# Patient Record
Sex: FEMALE | Race: WHITE | NOT HISPANIC OR LATINO | Employment: FULL TIME | URBAN - METROPOLITAN AREA
[De-identification: names, ages, dates, MRNs, and addresses within clinical notes are randomized per-mention and may not be internally consistent; named-entity substitution may affect disease eponyms.]

---

## 2023-09-26 ENCOUNTER — HOSPITAL ENCOUNTER (EMERGENCY)
Facility: CLINIC | Age: 53
Discharge: HOME | End: 2023-09-26
Attending: EMERGENCY MEDICINE | Admitting: EMERGENCY MEDICINE
Payer: COMMERCIAL

## 2023-09-26 VITALS
HEART RATE: 84 BPM | SYSTOLIC BLOOD PRESSURE: 132 MMHG | DIASTOLIC BLOOD PRESSURE: 80 MMHG | TEMPERATURE: 97.3 F | RESPIRATION RATE: 18 BRPM | OXYGEN SATURATION: 100 %

## 2023-09-26 DIAGNOSIS — L03.012 CELLULITIS OF THUMB, LEFT: ICD-10-CM

## 2023-09-26 LAB
ANION GAP SERPL CALCULATED.3IONS-SCNC: 10 MMOL/L (ref 7–15)
BASOPHILS # BLD AUTO: 0.1 10E3/UL (ref 0–0.2)
BASOPHILS NFR BLD AUTO: 1 %
BUN SERPL-MCNC: 15.4 MG/DL (ref 6–20)
CALCIUM SERPL-MCNC: 9.3 MG/DL (ref 8.6–10)
CHLORIDE SERPL-SCNC: 101 MMOL/L (ref 98–107)
CREAT SERPL-MCNC: 0.81 MG/DL (ref 0.51–0.95)
DEPRECATED HCO3 PLAS-SCNC: 28 MMOL/L (ref 22–29)
EGFRCR SERPLBLD CKD-EPI 2021: 85 ML/MIN/1.73M2
EOSINOPHIL # BLD AUTO: 0.4 10E3/UL (ref 0–0.7)
EOSINOPHIL NFR BLD AUTO: 5 %
ERYTHROCYTE [DISTWIDTH] IN BLOOD BY AUTOMATED COUNT: 14 % (ref 10–15)
GLUCOSE SERPL-MCNC: 92 MG/DL (ref 70–99)
HCT VFR BLD AUTO: 36.5 % (ref 35–47)
HGB BLD-MCNC: 12.3 G/DL (ref 11.7–15.7)
HOLD SPECIMEN: NORMAL
IMM GRANULOCYTES # BLD: 0 10E3/UL
IMM GRANULOCYTES NFR BLD: 1 %
LYMPHOCYTES # BLD AUTO: 1.7 10E3/UL (ref 0.8–5.3)
LYMPHOCYTES NFR BLD AUTO: 23 %
MCH RBC QN AUTO: 30.8 PG (ref 26.5–33)
MCHC RBC AUTO-ENTMCNC: 33.7 G/DL (ref 31.5–36.5)
MCV RBC AUTO: 92 FL (ref 78–100)
MONOCYTES # BLD AUTO: 0.6 10E3/UL (ref 0–1.3)
MONOCYTES NFR BLD AUTO: 8 %
NEUTROPHILS # BLD AUTO: 4.6 10E3/UL (ref 1.6–8.3)
NEUTROPHILS NFR BLD AUTO: 62 %
NRBC # BLD AUTO: 0 10E3/UL
NRBC BLD AUTO-RTO: 0 /100
PLATELET # BLD AUTO: 272 10E3/UL (ref 150–450)
POTASSIUM SERPL-SCNC: 4 MMOL/L (ref 3.4–5.3)
RBC # BLD AUTO: 3.99 10E6/UL (ref 3.8–5.2)
SODIUM SERPL-SCNC: 139 MMOL/L (ref 135–145)
WBC # BLD AUTO: 7.4 10E3/UL (ref 4–11)

## 2023-09-26 PROCEDURE — 80048 BASIC METABOLIC PNL TOTAL CA: CPT | Performed by: EMERGENCY MEDICINE

## 2023-09-26 PROCEDURE — 99283 EMERGENCY DEPT VISIT LOW MDM: CPT

## 2023-09-26 PROCEDURE — 85025 COMPLETE CBC W/AUTO DIFF WBC: CPT | Performed by: EMERGENCY MEDICINE

## 2023-09-26 PROCEDURE — 36415 COLL VENOUS BLD VENIPUNCTURE: CPT | Performed by: EMERGENCY MEDICINE

## 2023-09-26 RX ORDER — CEPHALEXIN 500 MG/1
500 CAPSULE ORAL 4 TIMES DAILY
Qty: 28 CAPSULE | Refills: 0 | Status: ON HOLD | OUTPATIENT
Start: 2023-09-26 | End: 2023-09-29

## 2023-09-26 ASSESSMENT — ACTIVITIES OF DAILY LIVING (ADL)
ADLS_ACUITY_SCORE: 33
ADLS_ACUITY_SCORE: 33

## 2023-09-27 ENCOUNTER — ANESTHESIA EVENT (OUTPATIENT)
Dept: SURGERY | Facility: CLINIC | Age: 53
DRG: 603 | End: 2023-09-27
Payer: COMMERCIAL

## 2023-09-27 ENCOUNTER — ANESTHESIA (OUTPATIENT)
Dept: SURGERY | Facility: CLINIC | Age: 53
DRG: 603 | End: 2023-09-27
Payer: COMMERCIAL

## 2023-09-27 ENCOUNTER — HOSPITAL ENCOUNTER (INPATIENT)
Facility: CLINIC | Age: 53
LOS: 2 days | Discharge: HOME | DRG: 603 | End: 2023-09-29
Attending: EMERGENCY MEDICINE | Admitting: INTERNAL MEDICINE
Payer: COMMERCIAL

## 2023-09-27 DIAGNOSIS — M65.949 FLEXOR TENOSYNOVITIS OF FINGER: ICD-10-CM

## 2023-09-27 LAB
ANION GAP SERPL CALCULATED.3IONS-SCNC: 10 MMOL/L (ref 7–15)
BASOPHILS # BLD AUTO: 0.1 10E3/UL (ref 0–0.2)
BASOPHILS NFR BLD AUTO: 1 %
BUN SERPL-MCNC: 14.4 MG/DL (ref 6–20)
CALCIUM SERPL-MCNC: 9.3 MG/DL (ref 8.6–10)
CHLORIDE SERPL-SCNC: 100 MMOL/L (ref 98–107)
CREAT SERPL-MCNC: 1.01 MG/DL (ref 0.51–0.95)
DEPRECATED HCO3 PLAS-SCNC: 27 MMOL/L (ref 22–29)
EGFRCR SERPLBLD CKD-EPI 2021: 65 ML/MIN/1.73M2
EOSINOPHIL # BLD AUTO: 0.3 10E3/UL (ref 0–0.7)
EOSINOPHIL NFR BLD AUTO: 4 %
ERYTHROCYTE [DISTWIDTH] IN BLOOD BY AUTOMATED COUNT: 14.1 % (ref 10–15)
GLUCOSE SERPL-MCNC: 94 MG/DL (ref 70–99)
HCT VFR BLD AUTO: 38.1 % (ref 35–47)
HGB BLD-MCNC: 12.8 G/DL (ref 11.7–15.7)
HOLD SPECIMEN: NORMAL
IMM GRANULOCYTES # BLD: 0 10E3/UL
IMM GRANULOCYTES NFR BLD: 1 %
LYMPHOCYTES # BLD AUTO: 1.5 10E3/UL (ref 0.8–5.3)
LYMPHOCYTES NFR BLD AUTO: 21 %
MCH RBC QN AUTO: 30.8 PG (ref 26.5–33)
MCHC RBC AUTO-ENTMCNC: 33.6 G/DL (ref 31.5–36.5)
MCV RBC AUTO: 92 FL (ref 78–100)
MONOCYTES # BLD AUTO: 0.7 10E3/UL (ref 0–1.3)
MONOCYTES NFR BLD AUTO: 9 %
NEUTROPHILS # BLD AUTO: 4.9 10E3/UL (ref 1.6–8.3)
NEUTROPHILS NFR BLD AUTO: 64 %
NRBC # BLD AUTO: 0 10E3/UL
NRBC BLD AUTO-RTO: 0 /100
PLATELET # BLD AUTO: 282 10E3/UL (ref 150–450)
POTASSIUM SERPL-SCNC: 4.4 MMOL/L (ref 3.4–5.3)
RBC # BLD AUTO: 4.15 10E6/UL (ref 3.8–5.2)
SODIUM SERPL-SCNC: 137 MMOL/L (ref 135–145)
WBC # BLD AUTO: 7.5 10E3/UL (ref 4–11)

## 2023-09-27 PROCEDURE — 250N000013 HC RX MED GY IP 250 OP 250 PS 637: Performed by: STUDENT IN AN ORGANIZED HEALTH CARE EDUCATION/TRAINING PROGRAM

## 2023-09-27 PROCEDURE — 710N000009 HC RECOVERY PHASE 1, LEVEL 1, PER MIN: Performed by: ORTHOPAEDIC SURGERY

## 2023-09-27 PROCEDURE — 250N000011 HC RX IP 250 OP 636: Performed by: ORTHOPAEDIC SURGERY

## 2023-09-27 PROCEDURE — 87075 CULTR BACTERIA EXCEPT BLOOD: CPT | Performed by: ORTHOPAEDIC SURGERY

## 2023-09-27 PROCEDURE — 99222 1ST HOSP IP/OBS MODERATE 55: CPT | Performed by: INTERNAL MEDICINE

## 2023-09-27 PROCEDURE — 250N000009 HC RX 250: Performed by: ORTHOPAEDIC SURGERY

## 2023-09-27 PROCEDURE — 250N000025 HC SEVOFLURANE, PER MIN: Performed by: ORTHOPAEDIC SURGERY

## 2023-09-27 PROCEDURE — 250N000009 HC RX 250: Performed by: NURSE ANESTHETIST, CERTIFIED REGISTERED

## 2023-09-27 PROCEDURE — 120N000004 HC R&B MS OVERFLOW

## 2023-09-27 PROCEDURE — 250N000011 HC RX IP 250 OP 636: Performed by: NURSE ANESTHETIST, CERTIFIED REGISTERED

## 2023-09-27 PROCEDURE — 370N000017 HC ANESTHESIA TECHNICAL FEE, PER MIN: Performed by: ORTHOPAEDIC SURGERY

## 2023-09-27 PROCEDURE — 0HDGXZZ EXTRACTION OF LEFT HAND SKIN, EXTERNAL APPROACH: ICD-10-PCS | Performed by: ORTHOPAEDIC SURGERY

## 2023-09-27 PROCEDURE — 272N000001 HC OR GENERAL SUPPLY STERILE: Performed by: ORTHOPAEDIC SURGERY

## 2023-09-27 PROCEDURE — 87070 CULTURE OTHR SPECIMN AEROBIC: CPT | Performed by: ORTHOPAEDIC SURGERY

## 2023-09-27 PROCEDURE — 250N000011 HC RX IP 250 OP 636: Performed by: EMERGENCY MEDICINE

## 2023-09-27 PROCEDURE — 999N000141 HC STATISTIC PRE-PROCEDURE NURSING ASSESSMENT: Performed by: ORTHOPAEDIC SURGERY

## 2023-09-27 PROCEDURE — 36415 COLL VENOUS BLD VENIPUNCTURE: CPT | Performed by: EMERGENCY MEDICINE

## 2023-09-27 PROCEDURE — 99285 EMERGENCY DEPT VISIT HI MDM: CPT | Mod: 25

## 2023-09-27 PROCEDURE — 87040 BLOOD CULTURE FOR BACTERIA: CPT | Performed by: EMERGENCY MEDICINE

## 2023-09-27 PROCEDURE — 96365 THER/PROPH/DIAG IV INF INIT: CPT

## 2023-09-27 PROCEDURE — 87205 SMEAR GRAM STAIN: CPT | Performed by: ORTHOPAEDIC SURGERY

## 2023-09-27 PROCEDURE — 80048 BASIC METABOLIC PNL TOTAL CA: CPT | Performed by: STUDENT IN AN ORGANIZED HEALTH CARE EDUCATION/TRAINING PROGRAM

## 2023-09-27 PROCEDURE — 36415 COLL VENOUS BLD VENIPUNCTURE: CPT | Performed by: STUDENT IN AN ORGANIZED HEALTH CARE EDUCATION/TRAINING PROGRAM

## 2023-09-27 PROCEDURE — 360N000075 HC SURGERY LEVEL 2, PER MIN: Performed by: ORTHOPAEDIC SURGERY

## 2023-09-27 PROCEDURE — 258N000003 HC RX IP 258 OP 636: Performed by: NURSE ANESTHETIST, CERTIFIED REGISTERED

## 2023-09-27 PROCEDURE — 85025 COMPLETE CBC W/AUTO DIFF WBC: CPT | Performed by: STUDENT IN AN ORGANIZED HEALTH CARE EDUCATION/TRAINING PROGRAM

## 2023-09-27 RX ORDER — HYDROMORPHONE HCL IN WATER/PF 6 MG/30 ML
0.2 PATIENT CONTROLLED ANALGESIA SYRINGE INTRAVENOUS EVERY 5 MIN PRN
Status: DISCONTINUED | OUTPATIENT
Start: 2023-09-27 | End: 2023-09-27 | Stop reason: HOSPADM

## 2023-09-27 RX ORDER — CEFAZOLIN SODIUM/WATER 2 G/20 ML
2 SYRINGE (ML) INTRAVENOUS EVERY 8 HOURS
Status: DISCONTINUED | OUTPATIENT
Start: 2023-09-27 | End: 2023-09-27

## 2023-09-27 RX ORDER — DIPHENHYDRAMINE HYDROCHLORIDE 50 MG/ML
25 INJECTION INTRAMUSCULAR; INTRAVENOUS EVERY 6 HOURS PRN
Status: DISCONTINUED | OUTPATIENT
Start: 2023-09-27 | End: 2023-09-27 | Stop reason: HOSPADM

## 2023-09-27 RX ORDER — SODIUM CHLORIDE, SODIUM LACTATE, POTASSIUM CHLORIDE, CALCIUM CHLORIDE 600; 310; 30; 20 MG/100ML; MG/100ML; MG/100ML; MG/100ML
INJECTION, SOLUTION INTRAVENOUS CONTINUOUS
Status: DISCONTINUED | OUTPATIENT
Start: 2023-09-27 | End: 2023-09-27 | Stop reason: HOSPADM

## 2023-09-27 RX ORDER — ONDANSETRON 2 MG/ML
4 INJECTION INTRAMUSCULAR; INTRAVENOUS EVERY 30 MIN PRN
Status: DISCONTINUED | OUTPATIENT
Start: 2023-09-27 | End: 2023-09-27 | Stop reason: HOSPADM

## 2023-09-27 RX ORDER — HYDRALAZINE HYDROCHLORIDE 20 MG/ML
2.5-5 INJECTION INTRAMUSCULAR; INTRAVENOUS EVERY 10 MIN PRN
Status: DISCONTINUED | OUTPATIENT
Start: 2023-09-27 | End: 2023-09-27 | Stop reason: HOSPADM

## 2023-09-27 RX ORDER — ALBUTEROL SULFATE 0.83 MG/ML
2.5 SOLUTION RESPIRATORY (INHALATION) EVERY 4 HOURS PRN
Status: DISCONTINUED | OUTPATIENT
Start: 2023-09-27 | End: 2023-09-27 | Stop reason: HOSPADM

## 2023-09-27 RX ORDER — DEXAMETHASONE SODIUM PHOSPHATE 4 MG/ML
INJECTION, SOLUTION INTRA-ARTICULAR; INTRALESIONAL; INTRAMUSCULAR; INTRAVENOUS; SOFT TISSUE PRN
Status: DISCONTINUED | OUTPATIENT
Start: 2023-09-27 | End: 2023-09-27

## 2023-09-27 RX ORDER — ONDANSETRON 2 MG/ML
4 INJECTION INTRAMUSCULAR; INTRAVENOUS EVERY 6 HOURS PRN
Status: DISCONTINUED | OUTPATIENT
Start: 2023-09-27 | End: 2023-09-29 | Stop reason: HOSPADM

## 2023-09-27 RX ORDER — FENTANYL CITRATE 50 UG/ML
50 INJECTION, SOLUTION INTRAMUSCULAR; INTRAVENOUS EVERY 5 MIN PRN
Status: DISCONTINUED | OUTPATIENT
Start: 2023-09-27 | End: 2023-09-27 | Stop reason: HOSPADM

## 2023-09-27 RX ORDER — LIDOCAINE 40 MG/G
CREAM TOPICAL
Status: DISCONTINUED | OUTPATIENT
Start: 2023-09-27 | End: 2023-09-27 | Stop reason: HOSPADM

## 2023-09-27 RX ORDER — LABETALOL HYDROCHLORIDE 5 MG/ML
10 INJECTION, SOLUTION INTRAVENOUS
Status: DISCONTINUED | OUTPATIENT
Start: 2023-09-27 | End: 2023-09-27 | Stop reason: HOSPADM

## 2023-09-27 RX ORDER — BUPIVACAINE HYDROCHLORIDE 2.5 MG/ML
INJECTION, SOLUTION INFILTRATION; PERINEURAL PRN
Status: DISCONTINUED | OUTPATIENT
Start: 2023-09-27 | End: 2023-09-27 | Stop reason: HOSPADM

## 2023-09-27 RX ORDER — LIDOCAINE HYDROCHLORIDE 20 MG/ML
INJECTION, SOLUTION INFILTRATION; PERINEURAL PRN
Status: DISCONTINUED | OUTPATIENT
Start: 2023-09-27 | End: 2023-09-27

## 2023-09-27 RX ORDER — ONDANSETRON 4 MG/1
4 TABLET, ORALLY DISINTEGRATING ORAL EVERY 30 MIN PRN
Status: DISCONTINUED | OUTPATIENT
Start: 2023-09-27 | End: 2023-09-27 | Stop reason: HOSPADM

## 2023-09-27 RX ORDER — DIAZEPAM 10 MG/2ML
2.5 INJECTION, SOLUTION INTRAMUSCULAR; INTRAVENOUS
Status: DISCONTINUED | OUTPATIENT
Start: 2023-09-27 | End: 2023-09-27 | Stop reason: HOSPADM

## 2023-09-27 RX ORDER — DIMENHYDRINATE 50 MG/ML
25 INJECTION, SOLUTION INTRAMUSCULAR; INTRAVENOUS
Status: DISCONTINUED | OUTPATIENT
Start: 2023-09-27 | End: 2023-09-27 | Stop reason: HOSPADM

## 2023-09-27 RX ORDER — ACETAMINOPHEN 325 MG/1
975 TABLET ORAL ONCE
Status: COMPLETED | OUTPATIENT
Start: 2023-09-27 | End: 2023-09-27

## 2023-09-27 RX ORDER — DIPHENHYDRAMINE HCL 25 MG
25 CAPSULE ORAL EVERY 6 HOURS PRN
Status: DISCONTINUED | OUTPATIENT
Start: 2023-09-27 | End: 2023-09-27 | Stop reason: HOSPADM

## 2023-09-27 RX ORDER — PROPOFOL 10 MG/ML
INJECTION, EMULSION INTRAVENOUS PRN
Status: DISCONTINUED | OUTPATIENT
Start: 2023-09-27 | End: 2023-09-27

## 2023-09-27 RX ORDER — CEFAZOLIN SODIUM 1 G/3ML
1 INJECTION, POWDER, FOR SOLUTION INTRAMUSCULAR; INTRAVENOUS EVERY 8 HOURS
Status: DISCONTINUED | OUTPATIENT
Start: 2023-09-27 | End: 2023-09-29 | Stop reason: HOSPADM

## 2023-09-27 RX ORDER — MAGNESIUM HYDROXIDE 1200 MG/15ML
LIQUID ORAL PRN
Status: DISCONTINUED | OUTPATIENT
Start: 2023-09-27 | End: 2023-09-27 | Stop reason: HOSPADM

## 2023-09-27 RX ORDER — FENTANYL CITRATE 50 UG/ML
INJECTION, SOLUTION INTRAMUSCULAR; INTRAVENOUS PRN
Status: DISCONTINUED | OUTPATIENT
Start: 2023-09-27 | End: 2023-09-27

## 2023-09-27 RX ORDER — ONDANSETRON 4 MG/1
4 TABLET, ORALLY DISINTEGRATING ORAL EVERY 6 HOURS PRN
Status: DISCONTINUED | OUTPATIENT
Start: 2023-09-27 | End: 2023-09-29 | Stop reason: HOSPADM

## 2023-09-27 RX ORDER — HALOPERIDOL 5 MG/ML
1 INJECTION INTRAMUSCULAR
Status: DISCONTINUED | OUTPATIENT
Start: 2023-09-27 | End: 2023-09-27 | Stop reason: HOSPADM

## 2023-09-27 RX ORDER — ONDANSETRON 2 MG/ML
INJECTION INTRAMUSCULAR; INTRAVENOUS PRN
Status: DISCONTINUED | OUTPATIENT
Start: 2023-09-27 | End: 2023-09-27

## 2023-09-27 RX ORDER — SODIUM CHLORIDE, SODIUM LACTATE, POTASSIUM CHLORIDE, CALCIUM CHLORIDE 600; 310; 30; 20 MG/100ML; MG/100ML; MG/100ML; MG/100ML
INJECTION, SOLUTION INTRAVENOUS CONTINUOUS PRN
Status: DISCONTINUED | OUTPATIENT
Start: 2023-09-27 | End: 2023-09-27

## 2023-09-27 RX ORDER — GLYCOPYRROLATE 0.2 MG/ML
INJECTION, SOLUTION INTRAMUSCULAR; INTRAVENOUS PRN
Status: DISCONTINUED | OUTPATIENT
Start: 2023-09-27 | End: 2023-09-27

## 2023-09-27 RX ORDER — HYDROMORPHONE HCL IN WATER/PF 6 MG/30 ML
0.4 PATIENT CONTROLLED ANALGESIA SYRINGE INTRAVENOUS EVERY 5 MIN PRN
Status: DISCONTINUED | OUTPATIENT
Start: 2023-09-27 | End: 2023-09-27 | Stop reason: HOSPADM

## 2023-09-27 RX ORDER — CEFAZOLIN SODIUM 1 G/3ML
1 INJECTION, POWDER, FOR SOLUTION INTRAMUSCULAR; INTRAVENOUS ONCE
Status: COMPLETED | OUTPATIENT
Start: 2023-09-27 | End: 2023-09-27

## 2023-09-27 RX ORDER — KETOROLAC TROMETHAMINE 30 MG/ML
INJECTION, SOLUTION INTRAMUSCULAR; INTRAVENOUS PRN
Status: DISCONTINUED | OUTPATIENT
Start: 2023-09-27 | End: 2023-09-27

## 2023-09-27 RX ORDER — MAGNESIUM HYDROXIDE/ALUMINUM HYDROXICE/SIMETHICONE 120; 1200; 1200 MG/30ML; MG/30ML; MG/30ML
30 SUSPENSION ORAL EVERY 4 HOURS PRN
Status: DISCONTINUED | OUTPATIENT
Start: 2023-09-27 | End: 2023-09-29 | Stop reason: HOSPADM

## 2023-09-27 RX ORDER — FENTANYL CITRATE 50 UG/ML
25 INJECTION, SOLUTION INTRAMUSCULAR; INTRAVENOUS EVERY 5 MIN PRN
Status: DISCONTINUED | OUTPATIENT
Start: 2023-09-27 | End: 2023-09-27 | Stop reason: HOSPADM

## 2023-09-27 RX ORDER — ACETAMINOPHEN 325 MG/1
650 TABLET ORAL EVERY 6 HOURS PRN
Status: DISCONTINUED | OUTPATIENT
Start: 2023-09-27 | End: 2023-09-29 | Stop reason: HOSPADM

## 2023-09-27 RX ORDER — ACETAMINOPHEN 325 MG/1
975 TABLET ORAL ONCE
Status: DISCONTINUED | OUTPATIENT
Start: 2023-09-27 | End: 2023-09-27 | Stop reason: HOSPADM

## 2023-09-27 RX ADMIN — SODIUM CHLORIDE, POTASSIUM CHLORIDE, SODIUM LACTATE AND CALCIUM CHLORIDE: 600; 310; 30; 20 INJECTION, SOLUTION INTRAVENOUS at 20:52

## 2023-09-27 RX ADMIN — KETOROLAC TROMETHAMINE 30 MG: 30 INJECTION, SOLUTION INTRAMUSCULAR at 20:35

## 2023-09-27 RX ADMIN — PROPOFOL 200 MG: 10 INJECTION, EMULSION INTRAVENOUS at 20:35

## 2023-09-27 RX ADMIN — ONDANSETRON 4 MG: 2 INJECTION INTRAMUSCULAR; INTRAVENOUS at 20:35

## 2023-09-27 RX ADMIN — LIDOCAINE HYDROCHLORIDE 50 MG: 20 INJECTION, SOLUTION INFILTRATION; PERINEURAL at 20:35

## 2023-09-27 RX ADMIN — HYDROMORPHONE HYDROCHLORIDE 1 MG: 1 INJECTION, SOLUTION INTRAMUSCULAR; INTRAVENOUS; SUBCUTANEOUS at 20:35

## 2023-09-27 RX ADMIN — CEFAZOLIN 1 G: 1 INJECTION, POWDER, FOR SOLUTION INTRAMUSCULAR; INTRAVENOUS at 14:42

## 2023-09-27 RX ADMIN — ACETAMINOPHEN 975 MG: 325 TABLET, FILM COATED ORAL at 20:22

## 2023-09-27 RX ADMIN — SODIUM CHLORIDE, POTASSIUM CHLORIDE, SODIUM LACTATE AND CALCIUM CHLORIDE: 600; 310; 30; 20 INJECTION, SOLUTION INTRAVENOUS at 20:26

## 2023-09-27 RX ADMIN — FENTANYL CITRATE 100 MCG: 50 INJECTION, SOLUTION INTRAMUSCULAR; INTRAVENOUS at 20:35

## 2023-09-27 RX ADMIN — DEXAMETHASONE SODIUM PHOSPHATE 8 MG: 4 INJECTION, SOLUTION INTRA-ARTICULAR; INTRALESIONAL; INTRAMUSCULAR; INTRAVENOUS; SOFT TISSUE at 20:35

## 2023-09-27 RX ADMIN — CEFAZOLIN 1 G: 1 INJECTION, POWDER, FOR SOLUTION INTRAMUSCULAR; INTRAVENOUS at 23:42

## 2023-09-27 RX ADMIN — GLYCOPYRROLATE 0.2 MG: 0.2 INJECTION, SOLUTION INTRAMUSCULAR; INTRAVENOUS at 20:35

## 2023-09-27 ASSESSMENT — ACTIVITIES OF DAILY LIVING (ADL)
ADLS_ACUITY_SCORE: 20
ADLS_ACUITY_SCORE: 35

## 2023-09-27 ASSESSMENT — LIFESTYLE VARIABLES: TOBACCO_USE: 1

## 2023-09-27 NOTE — CONSULTS
Hand Surgery    Spoke with Dr. Patterson of ID regarding patient's case.  He recommended IV Ancef only for now pending improvement or intraoperative cultures.  OR pending.  Keep NPO until final plan determined.    Alyssia Lindsay MD

## 2023-09-27 NOTE — DISCHARGE INSTRUCTIONS
Discharge Instructions  Cellulitis    Cellulitis is an infection of the skin that occurs when bacteria enter the skin.   Symptoms are generally redness, swelling, warmth and pain.  Your infection appeared to be appropriate to treat at home with antibiotics.  However, sometimes your infection may be worse than it seemed at first, or may worsen with time. If you have new or worse symptoms, you may need to be seen again in the Emergency Department or by your primary provider.    Generally, every Emergency Department visit should have a follow-up clinic visit with either a primary or a specialty clinic/provider. Please follow-up as instructed by your emergency provider today.    Return to the Emergency Department if:  The redness, pain, or swelling gets a lot worse.  If the red area was marked, return if it is red significantly beyond the marked area.  You are unable to get your antibiotics, or are vomiting (throwing up) these pills, or you cannot take them.  You are feeling more ill, weak or lightheaded.  You start to run a new fever (temperature >101 F).  Anything else about the infection worries or concerns you.  Treatment:  Start your antibiotics right away, and take them as prescribed. Be sure to finish the whole prescription, even if you are better.  Apply a heating pad, warm packs, or warm water soaks to the infected area for 15 minutes at a time, at least 3 times a day. Do not use a heating pad on your feet or legs if you have diabetes. Do not sleep with a heating pad on, since this can cause burns or skin injury.  Rest your injured area for at least 1-2 days. After that you may start using your extremity again as long as there is not too much pain.   Raise the injured area above the level of your heart as much as possible in the first 1-2 days.  Tylenol  (acetaminophen), Motrin  (ibuprofen), or Advil  (ibuprofen) may help may help reduce pain and fever and may help you feel more comfortable. Be sure to read and  follow the package directions, and ask your provider if you have questions.    If you were given a prescription for medicine here today, be sure to read all of the information (including the package insert) that comes with your prescription.  This will include important information about the medicine, its side effects, and any warnings that you need to know about.  The pharmacist who fills the prescription can provide more information and answer questions you may have about the medicine.  If you have questions or concerns that the pharmacist cannot address, please call or return to the Emergency Department.     Remember that you can always come back to the Emergency Department if you are not able to see your regular provider in the amount of time listed above, if you get any new symptoms, or if there is anything that worries you.    ~~~~~~~~~~~~~~~~    As discussed, it is very important that you follow-up with orthopedic hand clinic as this type of infection can progress to a serious infection called flexor tenosynovitis.  No indication for emergent surgical consultation this evening, but if it is worsening in terms of pain or redness and swelling and you are unable to get into hand clinic we would like you to return to the emergency department

## 2023-09-27 NOTE — ED TRIAGE NOTES
Pt has swelling in right hand, cut her hand 2 weeks ago. Now c/o pain. Has been taking oral antibiotic x 3 doses without relief.      Triage Assessment       Row Name 09/27/23 1029       Triage Assessment (Adult)    Airway WDL WDL       Respiratory WDL    Respiratory WDL WDL       Skin Circulation/Temperature WDL    Skin Circulation/Temperature WDL --  swelling in right hand       Cardiac WDL    Cardiac WDL WDL       Peripheral/Neurovascular WDL    Peripheral Neurovascular WDL WDL       Cognitive/Neuro/Behavioral WDL    Cognitive/Neuro/Behavioral WDL WDL

## 2023-09-27 NOTE — ED NOTES
"PIT/Triage Evaluation    Patient is a 53 year old female presented with cellulitis. Alissa explains she cut her left hand 1.5 weeks ago and noticed swelling, pain, and redness in the area after healing. She states that she was prescribed Bactrim by Urgent Care yesterday and Keflex by the ED yesterday, which she has not started. She states that her symptoms have not improved. Alissa explains that she was recommended to the ED by orthopedics.    Exam is notable for:    Patient Vitals for the past 24 hrs:   BP Temp Temp src Pulse Resp SpO2 Height Weight   09/27/23 1026 (!) 136/106 98.8  F (37.1  C) Oral 77 20 97 % 1.651 m (5' 5\") 86.2 kg (190 lb)     General: No distress  Musculoskeletal: There is some superficial erythema of the left thenar eminence.  There is mild erythema of the tip of the thumb.  There is no tenderness along the flexor sheath.  She does have active and passive range of motion of the thumb although somewhat limited.  There is a small amount of lymphangitis on the flexor surface of the wrist.  No crepitance in this area.  New line neurologic: Sensation to the fingertips is intact.    Appropriate interventions for symptom management were initiated if applicable.  Appropriate diagnostic tests were initiated if indicated.    Important information for subsequent clinician:  I spoke with ADOLFO Gilliland assisting with the hand surgery service today and will evaluate the patient here in the ED.    I briefly evaluated the patient and developed an initial plan of care. I discussed this plan and explained that this brief interaction does not constitute a full evaluation. Patient/family understands that they should wait to be fully evaluated and discuss any test results with another clinician prior to leaving the hospital.    Scribe Disclosure:  I, Anil Mason, am serving as a scribe at 2:00 PM on 9/27/2023 to document services personally performed by Alonzo Ayala MD based on my observations and the " provider's statements to me.     Alonzo Ayala MD  09/27/23 9778

## 2023-09-27 NOTE — H&P
History and Physical     Kara E Behrendt MRN# [id number]   YOB: 1970 Age: 53 year old      Date of Admission:  9/27/2023    Primary care provider: [facility], [facility] [place]          Assessment and Plan:     Summary of Stay: Kara E Behrendt is a 53 year old female with a history of breast ca stage 2 in 2010 s/p bilateral mastectomy, BCC and melanoma in situ both excised  admitted on 9/27/2023 with tenosynovitis    Approximately 10 days ago she was cleaning the hide of a bull they just had butchered.  They were removing the skin/tendons/fat that were still attached to the hide.  Quite messy and slippery and while using a filet knife she knicked multiple sites on her fingers.  She then soaked her hand in peroxide for 3 days.  Things seemed to get better and were almost cleared but still with some residual swelling so for the past 3 days she soaked her hand in epsom salts.     Yesterday she woke up with worsening swelling and more discomfort.  She was seen in the ED and given a Rx for cephalexin and tmp-sulfa.  She took the tmp-sulfa but never had the cephalexin filled.  She returns to the ER at the behest of ortho due to concerns for tenosynovitis.     Dr Ayala did have the hand surgeon see her in the ER and they are recommending she go to the OR for I&D.  They will try to get her in tonight, and if unable will take in the am     ER:  VSS and she is afebrile   BMP and CBC wnl     Problem List:   Tenosynovitis  D/w Dr Patterson, cefazolin alone for now as don't want to suppress unusual pathogens given setting where initial injury occurred.  No e/o sepsis  Appreciate ortho and ID input    Remote hx of breast cancer  Would presume cured at this point     COVID 19   S/p 3 shot moderna series 1/2022    DVT Prophylaxis: Pneumatic Compression Devices  Code Status: Full Code  Functional Status: independent  Saba: not needed  Access:   PIV              Time spent 55 minutes reviewing epic including  notes/labs/prior hx, current medications.  In addition to interviewing and examining the patient, updated patient and family regarding plan of care          Chief Complaint:     Concern for tenosynovitis        History of Present Illness:    Kara E Behrendt is a 53 year old female with a history of breast ca stage 2 in  s/p bilateral mastectomy, BCC and melanoma in situ both excised  admitted on 2023 with tenosynovitis    Approximately 10 days ago she was cleaning the hide of a bull they just had butchered.  They were removing the skin/tendons/fat that were still attached to the hide.  Quite messy and slippery and while using a filet knife she knicked multiple sites on her fingers.  She then soaked her hand in peroxide for 3 days.  Things seemed to get better and were almost cleared but still with some residual swelling so for the past 3 days she soaked her hand in epsom salts.     Yesterday she woke up with worsening swelling and more discomfort.  She was seen in the ED and given a Rx for cephalexin and tmp-sulfa.  She took the tmp-sulfa but never had the cephalexin filled.  She returns to the ER at the behest of ortho due to concerns for tenosynovitis.     Dr Ayala did have the hand surgeon see her in the ER and they are recommending she go to the OR for I&D.  They will try to get her in tonight, and if unable will take in the am     ER:  VSS and she is afebrile   BMP and CBC wnl       The history is obtained in discussion with the ER provider  Dr Ayala and the patient with excellent reliability     Epic and Care everywhere were extensively reviewed        Past Medical History:     Past Medical History:   Diagnosis Date    Breast cancer     stage 2 s/p double mastectomy and right LN dissection    History of basal cell carcinoma     Melanoma of skin              Past Surgical History:     Past Surgical History:   Procedure Laterality Date     SECTION      ENDOSCOPIC RELEASE CARPAL  "TUNNEL Bilateral     MASTECTOMY Bilateral     [place] teeth extraction               Social History:     Social History     Tobacco Use    Smoking status: Former     Types: Cigarettes    Smokeless tobacco: Never   Substance Use Topics    Alcohol use: Not on file             Family History:     Family History   Problem Relation Age of Onset    Hypertension Mother     Prostate Cancer Father             Allergies:     Allergies   Allergen Reactions    Contrast Dye Anaphylaxis    Penicillins              Medications:     Awaiting med rec          Review of Systems:     A Comprehensive greater than 10 system review of systems was carried out.  Pertinent positives and negatives are noted above.  Otherwise negative for contributory information.           Physical Exam:   Blood pressure (!) 140/84, pulse 68, temperature 97.8  F (36.6  C), temperature source Temporal, resp. rate 16, height 1.651 m (5' 5\"), weight 85.7 kg (189 lb), SpO2 100 %.  Exam:    General:  Pleasant nad looks stated age  HEENT:  Head nc/at sclera clear PERRL O/P:  Moist mucus membranes no posterior pharyngeal erythema or exudate.  Neck is supple  Lungs: cta b nl effort   CV:  RRR no m/r/g no le edema  Abd:  S/nt/nd no r/g  Neuro:  Cn 2-12 grossly intact and vázquez  Alert and oriented affect appropriate   Skin:  W/d no c/c                 Data:     Results for orders placed or performed during the hospital encounter of 09/27/23   Basic metabolic panel (BMP)     Status: Abnormal   Result Value Ref Range    Sodium 137 135 - 145 mmol/L    Potassium 4.4 3.4 - 5.3 mmol/L    Chloride 100 98 - 107 mmol/L    Carbon Dioxide (CO2) 27 22 - 29 mmol/L    Anion Gap 10 7 - 15 mmol/L    Urea Nitrogen 14.4 6.0 - 20.0 mg/dL    Creatinine 1.01 (H) 0.51 - 0.95 mg/dL    GFR Estimate 65 >60 mL/min/1.73m2    Calcium 9.3 8.6 - 10.0 mg/dL    Glucose 94 70 - 99 mg/dL   Extra Tube ([facility] Draw)     Status: None    Narrative    The following orders were created for panel order Extra " Tube ([facility] Draw).  Procedure                               Abnormality         Status                     ---------                               -----------         ------                     Extra Blood Culture Bottle[[id number]]                       Final result               Extra Blue Top Tube[[id number]]                              Final result               Extra Red Top Tube[[id number]]                               Final result                 Please view results for these tests on the individual orders.   CBC with platelets and differential     Status: None   Result Value Ref Range    WBC Count 7.5 4.0 - 11.0 10e3/uL    RBC Count 4.15 3.80 - 5.20 10e6/uL    Hemoglobin 12.8 11.7 - 15.7 g/dL    Hematocrit 38.1 35.0 - 47.0 %    MCV 92 78 - 100 fL    MCH 30.8 26.5 - 33.0 pg    MCHC 33.6 31.5 - 36.5 g/dL    RDW 14.1 10.0 - 15.0 %    Platelet Count 282 150 - 450 10e3/uL    % Neutrophils 64 %    % Lymphocytes 21 %    % Monocytes 9 %    % Eosinophils 4 %    % Basophils 1 %    % Immature Granulocytes 1 %    NRBCs per 100 WBC 0 <1 /100    Absolute Neutrophils 4.9 1.6 - 8.3 10e3/uL    Absolute Lymphocytes 1.5 0.8 - 5.3 10e3/uL    Absolute Monocytes 0.7 0.0 - 1.3 10e3/uL    Absolute Eosinophils 0.3 0.0 - 0.7 10e3/uL    Absolute Basophils 0.1 0.0 - 0.2 10e3/uL    Absolute Immature Granulocytes 0.0 <=0.4 10e3/uL    Absolute NRBCs 0.0 10e3/uL   Extra Blood Culture Bottle     Status: None   Result Value Ref Range    Hold Specimen JIC    Extra Blue Top Tube     Status: None   Result Value Ref Range    Hold Specimen JIC    Extra Red Top Tube     Status: None   Result Value Ref Range    Hold Specimen JIC    CBC + differential     Status: None    Narrative    The following orders were created for panel order CBC + differential.  Procedure                               Abnormality         Status                     ---------                               -----------         ------                     CBC with platelets and  d...[[id number]]                      Final result                 Please view results for these tests on the individual orders.

## 2023-09-27 NOTE — ED PROVIDER NOTES
History     Chief Complaint:  Cellulitis    The history is provided by the patient.      Kara E Behrendt is a 53 year old female presenting with cellulitis. Alissa explains that she cut her left hand 1.5 weeks ago and noticed swelling, pain, and redness in the area after healing. She states that she was prescribed Bactrim by Urgent Care yesterday, of which she has taken three doses. She also notes being prescribed Keflex by the ED yesterday, which she has not started. She states that her symptoms have not improved. Alissa explains that she was recommended to the ED by orthopedics.    Review of External Notes:   2023 urgent care note reviewed.  The patient was given Bactrim and referred into the ED.    Medications:    Niacinamide  Bactrim    Past Medical History:    Breast cancer  Basal cell carcinoma  Melanoma of skin    Past Surgical History:     section  Endoscopic release carpal tunnel  Mastectomy  [place] teeth extraction    Physical Exam   Patient Vitals for the past 24 hrs:   BP Temp Temp src Pulse Resp SpO2   23 1615 130/63 97.7  F (36.5  C) Oral 74 18 98 %   23 1322 (!) 147/74 98.4  F (36.9  C) Oral 67 16 95 %   23 0831 (!) 141/80 98.5  F (36.9  C) Oral 69 18 95 %   23 0400 116/62 -- -- -- -- 95 %   23 0300 114/63 -- -- -- -- 93 %   23 0100 118/61 -- -- -- -- 92 %   23 0000 133/69 -- -- -- -- --   23 2330 138/73 -- -- -- 15 94 %   23 2300 (!) 142/76 -- -- -- -- --   23 2226 (!) 150/82 -- -- 72 14 96 %   23 (!) 143/74 -- -- 60 13 95 %   23 (!) 150/71 -- -- 60 13 --   23 (!) 14374 -- -- 69 17 --   23 136/74 -- -- 60 11 --   23 137/69 97.3  F (36.3  C) Temporal 77 13 96 %   23 (!) 145/62 -- -- 81 10 96 %   23 -- -- -- 74 19 95 %   23 (!) 142/67 -- -- 80 21 96 %   23 136/70 -- -- 87 (!) 32 97 %   23 136 (!) 96.6  F (35.9  C)  Temporal 83 16 96 %     Physical Exam  Constitutional:       General: She is not in acute distress.     Appearance: Normal appearance. She is not diaphoretic.   HENT:      Head: Atraumatic.      Right Ear: External ear normal.      Left Ear: External ear normal.      Mouth/Throat:      Mouth: Mucous membranes are moist.   Eyes:      General: No scleral icterus.     Conjunctiva/sclera: Conjunctivae normal.   Cardiovascular:      Rate and Rhythm: Normal rate and regular rhythm.      Heart sounds: Normal heart sounds.   Pulmonary:      Effort: No respiratory distress.      Breath sounds: Normal breath sounds.   Abdominal:      General: Abdomen is flat. There is no distension.      Tenderness: There is no abdominal tenderness.   Musculoskeletal:      Cervical back: Neck supple.      Comments: There is some superficial erythema of the left thenar eminence.  There is mild erythema of the tip of the thumb.  There is no tenderness along the flexor sheath.  She does have active and passive range of motion of the thumb although somewhat limited.  There is a small amount of lymphangitis on the flexor surface of the wrist.  No crepitance in this area.   Skin:     General: Skin is warm.      Findings: No rash.   Neurological:      General: No focal deficit present.      Mental Status: She is alert and oriented to person, place, and time.      Comments: Sensation to the fingertips intact.   Psychiatric:         Mood and Affect: Mood normal.         Behavior: Behavior normal.           Emergency Department Course   Laboratory:  Labs Ordered and Resulted from Time of ED Arrival to Time of ED Departure   BASIC METABOLIC PANEL - Abnormal       Result Value    Sodium 137      Potassium 4.4      Chloride 100      Carbon Dioxide (CO2) 27      Anion Gap 10      Urea Nitrogen 14.4      Creatinine 1.01 (*)     GFR Estimate 65      Calcium 9.3      Glucose 94     CBC WITH PLATELETS AND DIFFERENTIAL    WBC Count 7.5      RBC Count 4.15       Hemoglobin 12.8      Hematocrit 38.1      MCV 92      MCH 30.8      MCHC 33.6      RDW 14.1      Platelet Count 282      % Neutrophils 64      % Lymphocytes 21      % Monocytes 9      % Eosinophils 4      % Basophils 1      % Immature Granulocytes 1      NRBCs per 100 WBC 0      Absolute Neutrophils 4.9      Absolute Lymphocytes 1.5      Absolute Monocytes 0.7      Absolute Eosinophils 0.3      Absolute Basophils 0.1      Absolute Immature Granulocytes 0.0      Absolute NRBCs 0.0       Emergency Department Course & Assessments:       Interventions:  Medications   acetaminophen (TYLENOL) tablet 650 mg (650 mg Oral $Given 9/28/23 0843)     Or   acetaminophen (TYLENOL) Suppository 650 mg ( Rectal See Alternative 9/28/23 0843)   magnesium hydroxide (MILK OF MAGNESIA) suspension 30 mL (has no administration in time range)   ondansetron (ZOFRAN ODT) ODT tab 4 mg ( Oral See Alternative 9/28/23 1156)     Or   ondansetron (ZOFRAN) injection 4 mg (4 mg Intravenous $Given 9/28/23 1156)   alum & mag hydroxide-simethicone (MAALOX) suspension 30 mL (has no administration in time range)   ceFAZolin (ANCEF) 1 g vial to attach to  ml bag for ADULT or 50 ml bag for PEDS (1 g Intravenous $New Bag 9/28/23 1614)   lactated ringers infusion ( Intravenous $New Bag 9/28/23 1154)   ciprofloxacin (CIPRO) tablet 500 mg (500 mg Oral $Given 9/28/23 1102)   ceFAZolin (ANCEF) 1 g vial to attach to  ml bag for ADULT or 50 ml bag for PEDS (0 g Intravenous Stopped 9/27/23 1514)   acetaminophen (TYLENOL) tablet 975 mg (975 mg Oral $Given 9/27/23 2022)      Assessments:  1359 I obtained history and examined the patient as noted above.    Consultations/Discussion of Management or Tests:  ED Course as of 09/28/23 1937   Wed Sep 27, 2023   1531 I spoke with Dr. Mishra of the hospitalist team regarding the patient, who accepted the patient for admission.     Disposition:  The patient was admitted to the hospital under the care of Dr. Mishra.      Impression & Plan    Medical Decision Making:  This patient presents with an area of swelling of her thumb.  She was seen here by hand surgery.  It was thought that the patient may have a deep space infection or potentially flexor tenosynovitis.  She will be taken to the OR for debridement.    Diagnosis:    ICD-10-CM    1. Flexor tenosynovitis of finger  M65.9         Scribe Disclosure:  I, Anil Mason, am serving as a scribe at 2:53 PM on 9/27/2023 to document services personally performed by Alonzo Ayala MD based on my observations and the provider's statements to me.   9/27/2023   Alonzo Ayala MD McRoberts, Sean Edward, MD  09/28/23 1937

## 2023-09-27 NOTE — ANESTHESIA PREPROCEDURE EVALUATION
Anesthesia Pre-Procedure Evaluation    Patient: Kara E Behrendt   MRN: [id number] : 1970        Procedure : Procedure(s):  Irrigation and debridement left thumb and flexor tendon sheath          Past Medical History:   Diagnosis Date    Breast cancer 2010    stage 2 s/p double mastectomy and right LN dissection    History of basal cell carcinoma     Melanoma of skin       Past Surgical History:   Procedure Laterality Date     SECTION      ENDOSCOPIC RELEASE CARPAL TUNNEL Bilateral     MASTECTOMY Bilateral     [place] teeth extraction        Allergies   Allergen Reactions    Contrast Dye Anaphylaxis    Penicillins       Social History     Tobacco Use    Smoking status: Former     Types: Cigarettes    Smokeless tobacco: Never   Substance Use Topics    Alcohol use: Not on file      Wt Readings from Last 1 Encounters:   23 85.7 kg (189 lb)        Anesthesia Evaluation   Pt has had prior anesthetic. Type: General and MAC.    No history of anesthetic complications       ROS/MED HX  ENT/Pulmonary:     (+)                tobacco use, Past use,                      Neurologic:  - neg neurologic ROS     Cardiovascular:  - neg cardiovascular ROS     METS/Exercise Tolerance:     Hematologic:  - neg hematologic  ROS     Musculoskeletal: Comment: Infected thumb after contaminated injury      GI/Hepatic:  - neg GI/hepatic ROS     Renal/Genitourinary:  - neg Renal ROS     Endo: Comment: Class 1 obesity    (+)               Obesity,       Psychiatric/Substance Use:  - neg psychiatric ROS     Infectious Disease: Comment: As indicated in MS portion of note      Malignancy:   (+) Malignancy, History of Breast and Skin.Breast CA Remission status post Surgery and Chemo.  Skin CA Remission status post Surgery.      Other:  - neg other ROS          Physical Exam    Airway        Mallampati: II   TM distance: > 3 FB   Neck ROM: full   Mouth opening: > 3 cm    Respiratory Devices and Support         Dental       (+)  Minor Abnormalities - some fillings, tiny chips      Cardiovascular   cardiovascular exam normal       Rhythm and rate: regular and normal     Pulmonary           breath sounds clear to auscultation       Other findings: Lab Test        09/27/23 09/26/23 1031 2013          WBC          7.5          7.4           HGB          12.8         12.3          MCV          92           92            PLT          282          272            Lab Test        09/27/23 09/26/23 1031 2013          NA           137          139           POTASSIUM    4.4          4.0           CHLORIDE     100          101           CO2          27           28            BUN          14.4         15.4          CR           1.01*        0.81          ANIONGAP     10           10            AMAURY          9.3          9.3           GLC          94           92                      OUTSIDE LABS:  CBC:   Lab Results   Component Value Date    WBC 7.5 09/27/2023    WBC 7.4 09/26/2023    HGB 12.8 09/27/2023    HGB 12.3 09/26/2023    HCT 38.1 09/27/2023    HCT 36.5 09/26/2023     09/27/2023     09/26/2023     BMP:   Lab Results   Component Value Date     09/27/2023     09/26/2023    POTASSIUM 4.4 09/27/2023    POTASSIUM 4.0 09/26/2023    CHLORIDE 100 09/27/2023    CHLORIDE 101 09/26/2023    CO2 27 09/27/2023    CO2 28 09/26/2023    BUN 14.4 09/27/2023    BUN 15.4 09/26/2023    CR 1.01 (H) 09/27/2023    CR 0.81 09/26/2023    GLC 94 09/27/2023    GLC 92 09/26/2023     COAGS: No results found for: PTT, INR, FIBR  POC: No results found for: BGM, HCG, HCGS  HEPATIC: No results found for: ALBUMIN, PROTTOTAL, ALT, AST, GGT, ALKPHOS, BILITOTAL, BILIDIRECT, SHYANN  OTHER:   Lab Results   Component Value Date    AMAURY 9.3 09/27/2023       Anesthesia Plan    ASA Status:  2    NPO Status:  NPO Appropriate    Anesthesia Type: General.     - Airway: LMA   Induction: Intravenous.    Maintenance: Balanced.        Consents    Anesthesia Plan(s) and associated risks, benefits, and realistic alternatives discussed. Questions answered and patient/representative(s) expressed understanding.     - Discussed: Risks, Benefits and Alternatives for BOTH SEDATION and the PROCEDURE were discussed     - Discussed with:  Patient      - Extended Intubation/Ventilatory Support Discussed: No.      - Patient is DNR/DNI Status: No     Use of blood products discussed: No .     Postoperative Care    Pain management: IV analgesics, Oral pain medications, Multi-modal analgesia.   PONV prophylaxis: Ondansetron (or other 5HT-3), Dexamethasone or Solumedrol     Comments:                Efrem Nix MD

## 2023-09-27 NOTE — ED PROVIDER NOTES
History     Chief Complaint:  Rash     HPI   Kara E Behrendt is a 53 year old female who presents in the ED with worsening left thumb redness and swelling. She got several small cuts to her left middle finger and index finger with a filet knife several days ago while tanning and will hide. She had initial redness to her middle finger, but this resolved, but she has now developed swelling and redness to her thumb. She was updated on her tetanus and was given a dose of Bactrim at  today. The redness is started to streak up her forearm. She denies fever, nausea, or vomiting.    Independent Historian:    None - Patient Only.    Review of External Notes:  I reviewed CareEverywhere and updated Epic.    Medications:    Niacinamide     Past Medical History:    Nonmelanoma skin cancer   Melanoma in situ   Breast cancer    Past Surgical History:    Mastectomy    section   Carpal tunnel release   [place] teeth extraction     Physical Exam   Patient Vitals for the past 24 hrs:   BP Temp Pulse Resp SpO2   23 132/80 97.3  F (36.3  C) 84 18 100 %      Physical Exam  Nursing note and vitals reviewed.  Constitutional: Cooperative.   Cardiovascular: Normal perfusion in left thumb  Pulmonary/Chest: Effort normal.   Musculoskeletal: Limited flexion of left thumb due to swelling.  See below for further detail  Neurological: Alert.  Sensation in distal left thumb normal  Skin: Skin is warm and dry. Erythema and swelling to the left thumb pad without fluctuance. There is erythema and warmth over the thenar eminence with mild lymphangitis on the volar forearm. No tenderness to the flexor sheath. She is able to extend the thumb without difficulty.  Psychiatric: Normal mood and affect.     Emergency Department Course     Laboratory:  Labs Ordered and Resulted from Time of ED Arrival to Time of ED Departure   BASIC METABOLIC PANEL - Normal       Result Value    Sodium 139      Potassium 4.0      Chloride 101       Carbon Dioxide (CO2) 28      Anion Gap 10      Urea Nitrogen 15.4      Creatinine 0.81      GFR Estimate 85      Calcium 9.3      Glucose 92     CBC WITH PLATELETS AND DIFFERENTIAL    WBC Count 7.4      RBC Count 3.99      Hemoglobin 12.3      Hematocrit 36.5      MCV 92      MCH 30.8      MCHC 33.7      RDW 14.0      Platelet Count 272      % Neutrophils 62      % Lymphocytes 23      % Monocytes 8      % Eosinophils 5      % Basophils 1      % Immature Granulocytes 1      NRBCs per 100 WBC 0      Absolute Neutrophils 4.6      Absolute Lymphocytes 1.7      Absolute Monocytes 0.6      Absolute Eosinophils 0.4      Absolute Basophils 0.1      Absolute Immature Granulocytes 0.0      Absolute NRBCs 0.0        Interventions:  Medications - No data to display     Assessments:  2317 I obtained history and examined the patient as noted above.    Independent Interpretation (X-rays, CTs, rhythm strip):  None    Consultations/Discussion of Management or Tests:  None    Social Determinants of Health affecting care:  None     Disposition:  The patient was discharged to home.     Impression & Plan      Medical Decision Making:  Kara E Behrendt is a 53 year old female who presents for evaluation of skin redness. The history, physical exam and supporting data are consistent with cellulitis.  She does have mild ascending lymphangitis in the volar forearm but blood counts are reassuring and there is been no fevers.  She was started on Bactrim earlier today I will add dual coverage with Keflex.  Given the thumb pad swelling I am concerned for possible early felon versus early flexor tenosynovitis over though she does not meet criteria for these at this time.  No indication for emergent hand surgery consultation though I will advise her to follow-up in the next 1 to 2 days closely and if worsening advised to return to the ER.    Diagnosis:    ICD-10-CM    1. Cellulitis of thumb, left  L03.012            Discharge Medications:  New  Prescriptions    CEPHALEXIN (KEFLEX) 500 MG CAPSULE    Take 1 capsule (500 mg) by mouth 4 times daily for 7 days        Scribe Disclosure:  I, Jay Miguel, am serving as a scribe at 11:09 PM on 9/26/2023 to document services personally performed by Tae Dwyer MD based on my observations and the provider's statements to me.              Tae Dwyer MD  09/27/23 0010

## 2023-09-27 NOTE — ED NOTES
"[facility]  ED Nurse Handoff Report    ED Chief complaint: Cellulitis  . ED Diagnosis:   Final diagnoses:   None       Allergies:   Allergies   Allergen Reactions    Contrast Dye Anaphylaxis    Penicillins        Code Status: Full Code    Activity level - Baseline/Home:  independent.  Activity Level - Current:   independent.   Lift room needed: No.   Bariatric: No   Needed: No   Isolation: No.   Infection: Not Applicable.     Respiratory status: Room air    Vital Signs (within 30 minutes):   Vitals:    09/27/23 1026 09/27/23 1355 09/27/23 1416   BP: (!) 136/106 125/82    Pulse: 77 69    Resp: 20     Temp: 98.8  F (37.1  C)     TempSrc: Oral     SpO2: 97% 100% 98%   Weight: 86.2 kg (190 lb)     Height: 1.651 m (5' 5\")         Cardiac Rhythm:  ,      Pain level:    Patient confused: No.   Patient Falls Risk: nonskid shoes/slippers when out of bed and patient and family education.   Elimination Status:  Due to void      Patient Report - Initial Complaint: Cellulitis.   Focused Assessment: Skin- Healed cuts to left fingertips. Pt was cleaning a cow hide about 1.5 wks ago. Started to notice swelling to hand and fingers with erythema. Started on PO abx, but erythema has spread. Radial pulse present and palpable +2. Seen by ortho in ED, plan to go to OR.    Abnormal Results:   Labs Ordered and Resulted from Time of ED Arrival to Time of ED Departure   BASIC METABOLIC PANEL - Abnormal       Result Value    Sodium 137      Potassium 4.4      Chloride 100      Carbon Dioxide (CO2) 27      Anion Gap 10      Urea Nitrogen 14.4      Creatinine 1.01 (*)     GFR Estimate 65      Calcium 9.3      Glucose 94     CBC WITH PLATELETS AND DIFFERENTIAL    WBC Count 7.5      RBC Count 4.15      Hemoglobin 12.8      Hematocrit 38.1      MCV 92      MCH 30.8      MCHC 33.6      RDW 14.1      Platelet Count 282      % Neutrophils 64      % Lymphocytes 21      % Monocytes 9      % Eosinophils 4      % " Basophils 1      % Immature Granulocytes 1      NRBCs per 100 WBC 0      Absolute Neutrophils 4.9      Absolute Lymphocytes 1.5      Absolute Monocytes 0.7      Absolute Eosinophils 0.3      Absolute Basophils 0.1      Absolute Immature Granulocytes 0.0      Absolute NRBCs 0.0     BLOOD CULTURE   BLOOD CULTURE        No orders to display       Treatments provided: 1 gram ancef  Family Comments: N/A  OBS brochure/video discussed/provided to patient:  N/A  ED Medications:   Medications   ceFAZolin (ANCEF) 1 g vial to attach to  ml bag for ADULT or 50 ml bag for PEDS (1 g Intravenous $New Bag 9/27/23 9324)       Drips infusing:  No  For the majority of the shift this patient was Green.   Interventions performed were N/A.    Sepsis treatment initiated: No    Cares/treatment/interventions/medications to be completed following ED care: See orders      ED Nurse Name: Denisse Childress RN  3:11 PM

## 2023-09-27 NOTE — ED NOTES
PIT/Triage Evaluation    Patient presents emergency department with a complaint of left thumb redness and swelling with streaking up the left forearm.  Patient cut her left middle finger several days ago, and now has swelling and redness of her thumb.  Patient was given a tetanus shot and a dose of Bactrim by her primary care provider today.  The redness has now started to streak up her forearm.  No fevers, nausea, vomiting.      Exam is notable for:  General: No distress  Abdomen: Soft, non-tender  Respiratory: No tachypnea  Cardiovascular: Equal pulses, brisk cap refill  Extremities: Moving all extremities.    No Kanavel signs.  No pain with extension or flexion of the thumb, no tenderness along the tendon of the thumb, thumb is held in extension, no fusiform swelling.      Appropriate interventions for symptom management were initiated if applicable.  Appropriate diagnostic tests were initiated if indicated.    Important information for subsequent clinician:  Cellulitis of the left thumb, no kanavel signs.  Got tetanus and 1 dose of Bactrim from PCP today.  Redness is now streaking up the forearm. Labs ordered    I briefly evaluated the patient and developed an initial plan of care. I discussed this plan and explained that this brief interaction does not constitute a full evaluation. Patient/family understands that they should wait to be fully evaluated and discuss any test results with another clinician prior to leaving the hospital.       Shea Rausch MD  09/26/23 9491

## 2023-09-27 NOTE — ED TRIAGE NOTES
Pt presents to triage with c/o cellutitis in L hand. Was at [facility] [place] today, told her to come here. Pt was trimming bushes 1.5 weeks ago and cut self. Had tentanus shot today. 1 dose of antibiotics today.

## 2023-09-28 LAB
ANION GAP SERPL CALCULATED.3IONS-SCNC: 8 MMOL/L (ref 7–15)
BUN SERPL-MCNC: 13 MG/DL (ref 6–20)
CALCIUM SERPL-MCNC: 8.9 MG/DL (ref 8.6–10)
CHLORIDE SERPL-SCNC: 105 MMOL/L (ref 98–107)
CREAT SERPL-MCNC: 0.95 MG/DL (ref 0.51–0.95)
EGFRCR SERPLBLD CKD-EPI 2021: 70 ML/MIN/1.73M2
ERYTHROCYTE [DISTWIDTH] IN BLOOD BY AUTOMATED COUNT: 14 % (ref 10–15)
GLUCOSE SERPL-MCNC: 135 MG/DL (ref 70–99)
GRAM STAIN RESULT: NORMAL
HCO3 SERPL-SCNC: 25 MMOL/L (ref 22–29)
HCT VFR BLD AUTO: 37.6 % (ref 35–47)
HGB BLD-MCNC: 12.6 G/DL (ref 11.7–15.7)
MCH RBC QN AUTO: 30.8 PG (ref 26.5–33)
MCHC RBC AUTO-ENTMCNC: 33.5 G/DL (ref 31.5–36.5)
MCV RBC AUTO: 92 FL (ref 78–100)
PLATELET # BLD AUTO: 245 10E3/UL (ref 150–450)
POTASSIUM SERPL-SCNC: 4.7 MMOL/L (ref 3.4–5.3)
RBC # BLD AUTO: 4.09 10E6/UL (ref 3.8–5.2)
SODIUM SERPL-SCNC: 138 MMOL/L (ref 135–145)
WBC # BLD AUTO: 7.7 10E3/UL (ref 4–11)

## 2023-09-28 PROCEDURE — 250N000011 HC RX IP 250 OP 636: Performed by: ORTHOPAEDIC SURGERY

## 2023-09-28 PROCEDURE — 99222 1ST HOSP IP/OBS MODERATE 55: CPT | Performed by: INTERNAL MEDICINE

## 2023-09-28 PROCEDURE — 99232 SBSQ HOSP IP/OBS MODERATE 35: CPT | Performed by: INTERNAL MEDICINE

## 2023-09-28 PROCEDURE — 250N000013 HC RX MED GY IP 250 OP 250 PS 637: Performed by: INTERNAL MEDICINE

## 2023-09-28 PROCEDURE — 258N000003 HC RX IP 258 OP 636: Performed by: HOSPITALIST

## 2023-09-28 PROCEDURE — 250N000011 HC RX IP 250 OP 636: Mod: JZ | Performed by: INTERNAL MEDICINE

## 2023-09-28 PROCEDURE — 36415 COLL VENOUS BLD VENIPUNCTURE: CPT | Performed by: INTERNAL MEDICINE

## 2023-09-28 PROCEDURE — 85027 COMPLETE CBC AUTOMATED: CPT | Performed by: INTERNAL MEDICINE

## 2023-09-28 PROCEDURE — 80048 BASIC METABOLIC PNL TOTAL CA: CPT | Performed by: INTERNAL MEDICINE

## 2023-09-28 PROCEDURE — 120N000004 HC R&B MS OVERFLOW

## 2023-09-28 RX ORDER — SULFAMETHOXAZOLE/TRIMETHOPRIM 800-160 MG
1 TABLET ORAL 2 TIMES DAILY
Status: ON HOLD | COMMUNITY
Start: 2023-09-26 | End: 2023-09-29

## 2023-09-28 RX ORDER — NIACINAMIDE 500 MG
500 TABLET ORAL DAILY
COMMUNITY
Start: 2022-10-27

## 2023-09-28 RX ORDER — SODIUM CHLORIDE, SODIUM LACTATE, POTASSIUM CHLORIDE, CALCIUM CHLORIDE 600; 310; 30; 20 MG/100ML; MG/100ML; MG/100ML; MG/100ML
INJECTION, SOLUTION INTRAVENOUS CONTINUOUS
Status: DISCONTINUED | OUTPATIENT
Start: 2023-09-28 | End: 2023-09-29 | Stop reason: HOSPADM

## 2023-09-28 RX ORDER — CALCIUM CARBONATE/VITAMIN D3 600 MG-10
1 TABLET ORAL DAILY
COMMUNITY

## 2023-09-28 RX ORDER — CIPROFLOXACIN 500 MG/1
500 TABLET, FILM COATED ORAL EVERY 12 HOURS SCHEDULED
Status: DISCONTINUED | OUTPATIENT
Start: 2023-09-28 | End: 2023-09-29 | Stop reason: HOSPADM

## 2023-09-28 RX ORDER — LACTOBACILLUS RHAMNOSUS GG 10B CELL
1 CAPSULE ORAL AT BEDTIME
COMMUNITY

## 2023-09-28 RX ADMIN — SODIUM CHLORIDE, POTASSIUM CHLORIDE, SODIUM LACTATE AND CALCIUM CHLORIDE: 600; 310; 30; 20 INJECTION, SOLUTION INTRAVENOUS at 01:31

## 2023-09-28 RX ADMIN — SODIUM CHLORIDE, POTASSIUM CHLORIDE, SODIUM LACTATE AND CALCIUM CHLORIDE: 600; 310; 30; 20 INJECTION, SOLUTION INTRAVENOUS at 22:23

## 2023-09-28 RX ADMIN — CIPROFLOXACIN 500 MG: 500 TABLET, FILM COATED ORAL at 11:02

## 2023-09-28 RX ADMIN — CEFAZOLIN 1 G: 1 INJECTION, POWDER, FOR SOLUTION INTRAMUSCULAR; INTRAVENOUS at 08:35

## 2023-09-28 RX ADMIN — ONDANSETRON 4 MG: 2 INJECTION INTRAMUSCULAR; INTRAVENOUS at 11:56

## 2023-09-28 RX ADMIN — CIPROFLOXACIN 500 MG: 500 TABLET, FILM COATED ORAL at 20:22

## 2023-09-28 RX ADMIN — ACETAMINOPHEN 650 MG: 325 TABLET, FILM COATED ORAL at 08:43

## 2023-09-28 RX ADMIN — CEFAZOLIN 1 G: 1 INJECTION, POWDER, FOR SOLUTION INTRAMUSCULAR; INTRAVENOUS at 16:14

## 2023-09-28 RX ADMIN — SODIUM CHLORIDE, POTASSIUM CHLORIDE, SODIUM LACTATE AND CALCIUM CHLORIDE: 600; 310; 30; 20 INJECTION, SOLUTION INTRAVENOUS at 11:54

## 2023-09-28 ASSESSMENT — ACTIVITIES OF DAILY LIVING (ADL)
ADLS_ACUITY_SCORE: 20

## 2023-09-28 NOTE — PROGRESS NOTES
[facility]  Hospitalist Progress Note  Jhony Patel M.D., M.B.A.   09/28/2023    Reason for Stay/active problem list    Left thumb infection with lymphangitis       Assessment and Plan:        Summary of Stay:     Kara E Behrendt is a 53 year old female with a history of breast ca stage 2 in 2010 s/p bilateral mastectomy, BCC and melanoma in situ both excised  admitted on 9/27/2023 with concern for tenosynovitis     Dr Ayala did have the hand surgeon see her in the ER and patient had irrigation debridement of left thumb with IP joint irrigation on 9/27/2023    Problem List with Assessment and Plan:    Left thumb cellulitis and lymphangitis  --Presentation was initially concerning for tenosynovitis.  Patient does also treated with IV antibiotic and I am surgery was consulted.    --Patient underwent irrigation debridement of left thumb with IP joint irrigation on 9/27/2023 and postoperative wanted to change with left thumb infection and lymphangitis.  There was no evidence of tenosynovitis per orthopedic surgery  -- Patient was admitted to the hospital for IV antibiotic and close monitoring   -- Infectious disease consulted and patient was continued with ancef and oral ciprofloxacin for empiric coverage of antibiotics.  --Currently she is doing well with optimize pain control.  Cultures pending.  Plan to continue antibiotic and evaluate patient tomorrow for discharge.    Remote hx of breast cancer  Would presume cured at this point      COVID 19   S/p 3 shot moderna series 1/2022      VTE Prophylaxis: Ambulate every shift  Code Status: Full Code  Diet: Regular Diet Adult    Saba Catheter: Not present     Disposition: May discharge home in the next 1 to 2 days pending improvement and availability of culture reports.  Orthopedic surgery and infectious disease physician following patient.        Interval History (Subjective):        Patient is seen and examined by me today and medical record  "reviewed.Overnight events noted and care discussed with nursing staff.  Patient care assumed by me this morning.  She is feeling better today.  No significant pain.  She was seen by infectious disease service and orthopedic surgery team.  Care plan discussed with bedside nurse.  No fever or chills.                  Physical Exam:        Last Vital Signs:  BP (!) 147/74   Pulse 67   Temp 98.4  F (36.9  C) (Oral)   Resp 16   Ht 1.651 m (5' 5\")   Wt 85.7 kg (189 lb)   LMP  (LMP Unknown)   SpO2 95%   BMI 31.45 kg/m      Wt Readings from Last 5 Encounters:   09/27/23 85.7 kg (189 lb)        Constitutional: Awake, alert, cooperative, no apparent distress     Respiratory: Clear to auscultation bilaterally, no crackles or wheezing   Cardiovascular: Regular rate and rhythm, normal S1 and S2, and no murmur noted   Abdomen: Normal bowel sounds, soft, non-distended, non-tender   Skin: Left hand dressed    Neuro: Alert with  no new focal weakness   Extremities: No edema   Other(s):        All other systems: Negative          Medications:        All current medications were reviewed with changes reflected in problem list.         Data:      All new lab and imaging data was reviewed.      Data reviewed today: I reviewed all new labs and imaging results over the last 24 hours. I personally reviewed   Recent Labs   Lab 09/28/23 0708 09/27/23 1031 09/26/23 2013   WBC 7.7 7.5 7.4   HGB 12.6 12.8 12.3   HCT 37.6 38.1 36.5   MCV 92 92 92    282 272     No results for input(s): CULT in the last 168 hours.  Recent Labs   Lab 09/28/23 0708 09/27/23 1031 09/26/23 2013    137 139   POTASSIUM 4.7 4.4 4.0   CHLORIDE 105 100 101   CO2 25 27 28   ANIONGAP 8 10 10   * 94 92   BUN 13.0 14.4 15.4   CR 0.95 1.01* 0.81   GFRESTIMATED 70 65 85   AMAURY 8.9 9.3 9.3       Recent Labs   Lab 09/28/23 0708 09/27/23 1031 09/26/23 2013   * 94 92     COVID Status:  COVID-19 PCR Results           No data to display    "           COVID-19 Antibody Results, Testing for Immunity           No data to display                 Disclaimer: This note consists of symbols derived from keyboarding, dictation and/or voice recognition software. As a result, there may be errors in the script that have gone undetected. Please consider this when interpreting information found in this chart.

## 2023-09-28 NOTE — PHARMACY-ADMISSION MEDICATION HISTORY
Pharmacist Admission Medication History    Admission medication history is complete. The information provided in this note is only as accurate as the sources available at the time of the update.    Medication reconciliation/reorder completed by provider prior to medication history? No    Information Source(s): Patient via in-person and phone    Pertinent Information: -    Changes made to PTA medication list:  Added: all medications (except cephalexin)  Deleted: None  Changed: None    Medication Affordability:  Not including over the counter (OTC) medications, was there a time in the past 3 months when you did not take your medications as prescribed because of cost?: No    Allergies reviewed with patient and updates made in EHR: yes    Medication History Completed By: Miguelina Perez RPH 9/28/2023 3:46 PM    Prior to Admission medications    Medication Sig Last Dose Taking? Auth Provider Long Term End Date   calcium carbonate-vitamin D (CALTRATE) 600-10 MG-MCG per tablet Take 1 tablet by mouth daily 9/27/2023 Yes Unknown, Entered By History     lactobacillus rhamnosus, GG, (CULTURELL) capsule Take 1 capsule by mouth At Bedtime 9/26/2023 Yes Unknown, Entered By History     niacinamide 500 MG tablet Take 500 mg by mouth daily 9/27/2023 Yes Unknown, Entered By History     PREBIOTIC PRODUCT PO Take by mouth At Bedtime 9/26/2023 Yes Unknown, Entered By History     sulfamethoxazole-trimethoprim (BACTRIM DS) 800-160 MG tablet Take 1 tablet by mouth 2 times daily 9/27/2023 at am Yes Unknown, Entered By History  10/3/23   vitamin B-12 (CYANOCOBALAMIN) 500 MCG tablet Take 1 tablet by mouth daily 9/27/2023 Yes Unknown, Entered By History     cephALEXin (KEFLEX) 500 MG capsule Take 1 capsule (500 mg) by mouth 4 times daily for 7 days Not started  Tae Dwyer MD  10/3/23

## 2023-09-28 NOTE — PLAN OF CARE
Goal Outcome Evaluation:         Vital Signs: WNL.   Pain/Comfort: patient stating no pain at this time. Patient encouraged to call RN if patient starts having increased pain. Patient stated an understanding.   Assessment: PIV site c/d/I and flushed with + BR. IVF infusing per MAR. L hand with surgical dressing intact and ace wrapped. L thumb numb - CMS intact.   Diet: patient tolerating clears. Fluids encouraged.   Output: patient up with SBA to bathroom and voiding independently.   Activity/Ambulation: patient up to bathroom with SBA. Patient reminded to call for help when needing to get OOB. Patient stated an understanding.   Social: patient calm and cooperative.   Plan: Pain control. Monitor VS. Maintain PIV. IVF. IV ABX. Monitor I&O. Advance diet as tolerated. Monitor surgical incision site. Neurovascular checks to L hand. Will continue to monitor and will notify service with any questions or concerns.

## 2023-09-28 NOTE — BRIEF OP NOTE
"   [facility]    Brief Operative Note    Pre-operative diagnosis: Infection [B99.9] of left thumb with lymphangitis  Post-operative diagnosis Same as pre-operative diagnosis    Procedure: Irrigation and debridement left thumb with IP joint irrigation.  Thenar eminence incision and drainage.  Surgeon: Surgeon(s) and Role:     * Alyssia Lindsay MD - Primary     * Amina Gilliland PA-C - Assisting  Anesthesia: General plus local consisting of 10cc 0.25% Marcaine plain   Estimated Blood Loss: 2 cc    Drains: 3 pieces 1/4\" packing plain.  Specimens:   ID Type Source Tests Collected by Time Destination   A : Left thumb swab for culture Swab Thumb, Left ANAEROBIC BACTERIAL CULTURE ROUTINE, GRAM STAIN, AEROBIC BACTERIAL CULTURE ROUTINE Alyssia Lindsay MD 9/27/2023  8:52 PM    B : Left thumb IT joint for culture Swab Thumb, Left ANAEROBIC BACTERIAL CULTURE ROUTINE, GRAM STAIN, AEROBIC BACTERIAL CULTURE ROUTINE Alyssia Lindsay MD 9/27/2023  8:57 PM      Findings:   Evidence of infection: deep space infection.  Concern for left thumb IP joint infection.  No clear evidence for tenosynovitis.  Complications: None.  Implants: * No implants in log *    Ancef   ID consult  ADAT  Dressing clean, dry, and intact until 24 hours.  Ortho PA to do dressing change tomorrow.        "

## 2023-09-28 NOTE — PLAN OF CARE
Goal Outcome Evaluation:         Patient admitted to room 252 from PACU. Initial nursing assessment and admission questions completed. Patient oriented to room and unit procedures. VSS. Patient slightly drowsy but easily arousable and A&O x4. VSS. Patient with R limb alert and armband in place. PIV site c/d/I with IVF infusing. L hand wrapped with surgical dressing intact. Numbness to L thumb but CMS intact with all other fingers. Patient is resting comfortably in bed. Will continue to monitor and will notify service with any questions or concerns.

## 2023-09-28 NOTE — ANESTHESIA CARE TRANSFER NOTE
Patient: Kara E Behrendt    Procedure: Procedure(s):  Irrigation and debridement left thumb and flexor tendon sheath       Diagnosis: Infection [B99.9]  Diagnosis Additional Information: No value filed.    Anesthesia Type:   General     Note:    Oropharynx: oropharynx clear of all foreign objects  Level of Consciousness: awake  Oxygen Supplementation: room air    Independent Airway: airway patency satisfactory and stable  Dentition: dentition unchanged  Vital Signs Stable: post-procedure vital signs reviewed and stable  Report to RN Given: handoff report given  Patient transferred to: PACU    Handoff Report: Identifed the Patient, Identified the Reponsible Provider, Reviewed the pertinent medical history, Discussed the surgical course, Reviewed Intra-OP anesthesia mangement and issues during anesthesia, Set expectations for post-procedure period and Allowed opportunity for questions and acknowledgement of understanding      Vitals:  Vitals Value Taken Time   /70 09/27/23 2123   Temp 96.6  F (35.9  C) 09/27/23 2122   Pulse 90 09/27/23 2125   Resp 10 09/27/23 2125   SpO2 97 % 09/27/23 2125   Vitals shown include unvalidated device data.    Electronically Signed By: Efrem Nix MD  September 27, 2023  9:27 PM

## 2023-09-28 NOTE — PROGRESS NOTES
Orthopedic Surgery  Kara E Behrendt  09/28/2023     Admit Date:  9/27/2023    POD: 1 Day Post-Op   Procedure(s):  Irrigation and debridement left thumb and flexor tendon sheath     Patient resting comfortably sitting on the side of the bed.     Pain is controlled.   Tolerating oral intake.    Denies nausea or vomiting  Denies chest pain or shortness of breath    Temp:  [96.6  F (35.9  C)-98.5  F (36.9  C)] 98.5  F (36.9  C)  Pulse:  [60-87] 69  Resp:  [10-32] 18  BP: (114-150)/(61-84) 141/80  SpO2:  [92 %-100 %] 95 %    Alert and oriented  Dressing partially removed by ID this morning. Adaptic, gauze, and packing was left intact. Erythema of the surrounding skin has improved since the procedure. Moderate swelling of the thumb.    Limited ROM of thumb with mild pain. Able to passively range IP, MCP without pain.   +Radial pulse    Labs:  Recent Labs   Lab Test 09/28/23  0708 09/27/23  1031 09/26/23 2013   WBC 7.7 7.5 7.4   HGB 12.6 12.8 12.3    282 272     No lab results found.  No lab results found.      1. PLAN:   Antibiotics per ID recommendation.     Current dressing was removed, 3 packing strips pulled, and a new soft dressing was applied.    Continue current pain regiment.   Dressings: Keep intact.  Change if >60% saturated or peeling off.    Follow-up: 1-2 weeks post-op with Dr. Lindsay's team    2. Disposition   Anticipate d/c to home when medically cleared.    Ita Sotelo PA-C

## 2023-09-28 NOTE — CONSULTS
[facility]    Infectious Disease Consultation     Date of Admission:  9/27/2023  Date of Consult (When I saw the patient): 09/28/23    Assessment & Plan   Kara E Behrendt is a 53 year old who was admitted on 9/27/2023.     Impression: 1 53-year-old female, nearly 2 weeks ago cleaning a steer hide over several days, no gloves, multiple cuts in the left hand, initially no signs of infection.  Then evolution of swelling and erythema particularly notable in the thumb area where there were not many cuts, now status post I&D no clear tenosynovitis but signs of deep infection, some lymphangitis probably conventional staph or strep type infection but obviously this extensive exposure history with no gloves or protection raises slight concern about bacillus anthracis infection  2 prior melanoma and breast cancer    REC 1 await cultures from surgery, continue Ancef for conventional organisms, add oral Cipro for possible cutaneous anthrax, pictures not particularly suggestive of that diagnosis but exposure history quite extensive and hard to ignore possibility fortunately no tenosynovitis so likely oral therapy either way probably Augmentin and Cipro once improved await postop findings and cultures and adjust  2 even on the small chance of cutaneous anthrax likely not contagious but out of an abundance of caution we will do contact  as there is some drainage        Bhanu Patterson MD    Reason for Consult   Reason for consult: I was asked to evaluate this patient for left hand infection.    Primary Care Physician   [facility]    Chief Complaint   Left thumb pain    History is obtained from the patient and medical records    History of Present Illness   Kara E Behrendt is a 53 year old female who presents with left hand infection.  2 weeks ago the patient started doing self hide removal from a steer, did this with no gloves cut herself with a knife multiple times in the left hand over  the several days that they were doing this.  None of them were particular impressive looking did not look infected she cleaned the area up and watched.  Over the next several days slight erythema developed.  She sought medical attention recently put on antibiotics, with not improving and worsening pain in the left thumb and lymphangitic streak going up the arm sought medical attention.  She has had no systemic symptoms of any kind throughout and now status post operative intervention.    Past Medical History   I have reviewed this patient's medical history and updated it with pertinent information if needed.   Past Medical History:   Diagnosis Date    Breast cancer     stage 2 s/p double mastectomy and right LN dissection    History of basal cell carcinoma     Melanoma of skin        Past Surgical History   I have reviewed this patient's surgical history and updated it with pertinent information if needed.  Past Surgical History:   Procedure Laterality Date     SECTION      ENDOSCOPIC RELEASE CARPAL TUNNEL Bilateral     MASTECTOMY Bilateral     [place] teeth extraction         Prior to Admission Medications   Prior to Admission Medications   Prescriptions Last Dose Informant Patient Reported? Taking?   cephALEXin (KEFLEX) 500 MG capsule   No No   Sig: Take 1 capsule (500 mg) by mouth 4 times daily for 7 days      Facility-Administered Medications: None     Allergies   Allergies   Allergen Reactions    Contrast Dye Anaphylaxis    Penicillins        Immunization History     There is no immunization history on file for this patient.    Social History   I have reviewed this patient's social history and updated it with pertinent information if needed. Alissa E Behrendt  reports that she has quit smoking. Her smoking use included cigarettes. She has never used smokeless tobacco.    Family History   I have reviewed this patient's family history and updated it with pertinent information if needed.   Family History    Problem Relation Age of Onset    Hypertension Mother     Prostate Cancer Father        Review of Systems   The 10 point Review of Systems is negative other than the hand pain no systemic symptoms at all    Physical Exam   Temp: 98.5  F (36.9  C) Temp src: Oral BP: (!) 141/80 Pulse: 69   Resp: 18 SpO2: 95 % O2 Device: None (Room air)    Vital Signs with Ranges  Temp:  [96.6  F (35.9  C)-98.5  F (36.9  C)] 98.5  F (36.9  C)  Pulse:  [60-87] 69  Resp:  [10-32] 18  BP: (114-150)/(61-84) 141/80  SpO2:  [92 %-100 %] 95 %  189 lbs 0 oz  Body mass index is 31.45 kg/m .    GENERAL APPEARANCE:  awake  EYES: Eyes grossly normal to inspection  NECK: no adenopathy  RESP: lungs clear   CV: regular rates and rhythm  LYMPHATICS: normal ant/post cervical and supraclavicular nodes  ABDOMEN: soft, nontender  MS: extremities normal left hand wrapped, lymphangitis has resolved, pictures noted look like conventional infection rather than anthrax but of course not excluding diagnosis  SKIN: no suspicious lesions or rashes        Data   All laboratory and imaging data in the past 24 hours reviewed  No results for input(s): CULT in the last 168 hours.  No lab results found.    Invalid input(s):        All cultures:  Recent Labs   Lab 09/27/23  1435 09/27/23  1031   CULTURE No growth after 12 hours No growth after 12 hours      Blood culture:  Results for orders placed or performed during the hospital encounter of 09/27/23   Blood Culture Peripheral Blood    Specimen: Peripheral Blood   Result Value Ref Range    Culture No growth after 12 hours    Blood Culture Wrist, Left    Specimen: Wrist, Left; Blood   Result Value Ref Range    Culture No growth after 12 hours       Urine culture:  No results found for this or any previous visit.

## 2023-09-28 NOTE — ANESTHESIA POSTPROCEDURE EVALUATION
Patient: Kara E Behrendt    Procedure: Procedure(s):  Irrigation and debridement left thumb and flexor tendon sheath       Anesthesia Type:  General    Note:  Disposition: Inpatient   Postop Pain Control: Uneventful            Sign Out: Well controlled pain   PONV: No   Neuro/Psych: Uneventful            Sign Out: Acceptable/Baseline neuro status   Airway/Respiratory: Uneventful            Sign Out: Acceptable/Baseline resp. status   CV/Hemodynamics: Uneventful            Sign Out: Acceptable CV status; No obvious hypovolemia; No obvious fluid overload   Other NRE: NONE   DID A NON-ROUTINE EVENT OCCUR? No           Last vitals:  Vitals Value Taken Time   /70 09/27/23 2123   Temp     Pulse 87 09/27/23 2124   Resp 32 09/27/23 2124   SpO2 97 % 09/27/23 2124   Vitals shown include unvalidated device data.    Electronically Signed By: Efrem Nix MD  September 27, 2023  9:26 PM

## 2023-09-29 VITALS
DIASTOLIC BLOOD PRESSURE: 70 MMHG | OXYGEN SATURATION: 97 % | HEART RATE: 63 BPM | WEIGHT: 189 LBS | RESPIRATION RATE: 18 BRPM | BODY MASS INDEX: 31.49 KG/M2 | HEIGHT: 65 IN | TEMPERATURE: 98.3 F | SYSTOLIC BLOOD PRESSURE: 142 MMHG

## 2023-09-29 PROCEDURE — 258N000003 HC RX IP 258 OP 636: Performed by: HOSPITALIST

## 2023-09-29 PROCEDURE — 250N000011 HC RX IP 250 OP 636: Performed by: ORTHOPAEDIC SURGERY

## 2023-09-29 PROCEDURE — 99232 SBSQ HOSP IP/OBS MODERATE 35: CPT | Performed by: INTERNAL MEDICINE

## 2023-09-29 PROCEDURE — 250N000013 HC RX MED GY IP 250 OP 250 PS 637: Performed by: INTERNAL MEDICINE

## 2023-09-29 PROCEDURE — 99239 HOSP IP/OBS DSCHRG MGMT >30: CPT | Performed by: INTERNAL MEDICINE

## 2023-09-29 RX ORDER — CEPHALEXIN 500 MG/1
500 CAPSULE ORAL 4 TIMES DAILY
Qty: 40 CAPSULE | Refills: 0 | Status: SHIPPED | OUTPATIENT
Start: 2023-09-29 | End: 2023-10-09

## 2023-09-29 RX ORDER — CIPROFLOXACIN 500 MG/1
500 TABLET, FILM COATED ORAL EVERY 12 HOURS
Qty: 20 TABLET | Refills: 0 | Status: SHIPPED | OUTPATIENT
Start: 2023-09-29 | End: 2023-10-09

## 2023-09-29 RX ADMIN — CEFAZOLIN 1 G: 1 INJECTION, POWDER, FOR SOLUTION INTRAMUSCULAR; INTRAVENOUS at 08:55

## 2023-09-29 RX ADMIN — ACETAMINOPHEN 650 MG: 325 TABLET, FILM COATED ORAL at 14:10

## 2023-09-29 RX ADMIN — ACETAMINOPHEN 650 MG: 325 TABLET, FILM COATED ORAL at 00:27

## 2023-09-29 RX ADMIN — CEFAZOLIN 1 G: 1 INJECTION, POWDER, FOR SOLUTION INTRAMUSCULAR; INTRAVENOUS at 00:13

## 2023-09-29 RX ADMIN — SODIUM CHLORIDE, POTASSIUM CHLORIDE, SODIUM LACTATE AND CALCIUM CHLORIDE: 600; 310; 30; 20 INJECTION, SOLUTION INTRAVENOUS at 09:07

## 2023-09-29 RX ADMIN — CIPROFLOXACIN 500 MG: 500 TABLET, FILM COATED ORAL at 08:54

## 2023-09-29 ASSESSMENT — ACTIVITIES OF DAILY LIVING (ADL)
ADLS_ACUITY_SCORE: 20

## 2023-09-29 NOTE — PLAN OF CARE
Goal Outcome Evaluation:  1900-2330  Alissa is doing very well. Reports 0/10 pain in L thumb, CMS intact, no numbness/tingling, good cap refill. JOSH wound and L radial pulse d/t ace bandage dressing. Passing gas and had a BM today. Tolerating regular diet. Ambulating steady and independent, declining fall risk interventions. R foot PIV WDL infusing LR@100. R limb alert.  Plan: oral cipro, IV ancef q8h, joint and blood cultures still pending, and provide for comfort needs.

## 2023-09-29 NOTE — PLAN OF CARE
Temp: 98.3  F (36.8  C) Temp src: Oral BP: 130/54 Pulse: 61   Resp: 18 SpO2: 98 % O2 Device: None (Room air)       Assumed are 7570-7060. A&Ox4. Up ad jian when OOB. Patient refusing fall risk interventions. On RA. On a regular diet. JOSH surgical incision and L radial pulse due to ace bandage wrap in place. CMS intact. PIV in R foot is infusing LR 100mL/hr. Complaints of L thumb being sore 2/10, given PRN Tylenol with reports of relief. Plan of care ongoing.

## 2023-09-29 NOTE — PROGRESS NOTES
[facility]  Infectious Disease Progress Note          Assessment and Plan:   Date of Admission:  9/27/2023  Date of Consult (When I saw the patient): 09/28/23        Assessment & Plan  Kara E Behrendt is a 53 year old who was admitted on 9/27/2023.      Impression: 1 53-year-old female, nearly 2 weeks ago cleaning a steer hide over several days, no gloves, multiple cuts in the left hand, initially no signs of infection.  Then evolution of swelling and erythema particularly notable in the thumb area where there were not many cuts, now status post I&D no clear tenosynovitis but signs of deep infection, some lymphangitis probably conventional staph or strep type infection but obviously this extensive exposure history with no gloves or protection raises slight concern about bacillus anthracis infection  2 prior melanoma and breast cancer  3 PCN listed allergy but was just GI intolerance     REC 1 pending cultures from surgery, neg so far,while here Ancef for conventional organisms, add oral Cipro for possible cutaneous anthrax, pictures not particularly suggestive of that diagnosis but exposure history quite extensive and hard to ignore possibility fortunately no tenosynovitis so likely oral therapy either way probably Augmentin and Cipro once improved await postop findings and cultures and adjust  2 even on the small chance of cutaneous anthrax likely not contagious but out of an abundance of caution we will do contact  as there is some drainage  3 OK disposition on kwflex 500 qid 7 days and cipro 500 bid 10 days if worsens return if ABX issue will call                 Interval History:     no new complaints and doing well; no cp, sob, n/v/d, or abd pain. Feels well cxs neg              Medications:      ceFAZolin  1 g Intravenous Q8H    ciprofloxacin  500 mg Oral Q12H [facility] (08/20)                  Physical Exam:   Blood pressure (!) 142/70, pulse 63, temperature 98.3  F (36.8  C), temperature  "source Oral, resp. rate 18, height 1.651 m (5' 5\"), weight 85.7 kg (189 lb), SpO2 97 %.  Wt Readings from Last 2 Encounters:   09/27/23 85.7 kg (189 lb)     Vital Signs with Ranges  Temp:  [97.7  F (36.5  C)-98.4  F (36.9  C)] 98.3  F (36.8  C)  Pulse:  [61-74] 63  Resp:  [16-18] 18  BP: (130-158)/(54-74) 142/70  Cuff Mean (mmHg):  [94] 94  SpO2:  [95 %-100 %] 97 %    Constitutional: Awake, alert, cooperative, no apparent distress     Lungs: Clear to auscultation bilaterally, no crackles or wheezing   Cardiovascular: Regular rate and rhythm, normal S1 and S2, and no murmur noted   Abdomen: Normal bowel sounds, soft, non-distended, non-tender   Skin: No rashes, no cyanosis, no edema hand better   Other:           Data:   All microbiology laboratory data reviewed.  Recent Labs   Lab Test 09/28/23  0708 09/27/23  1031 09/26/23 2013   WBC 7.7 7.5 7.4   HGB 12.6 12.8 12.3   HCT 37.6 38.1 36.5   MCV 92 92 92    282 272     Recent Labs   Lab Test 09/28/23  0708 09/27/23  1031 09/26/23 2013   CR 0.95 1.01* 0.81     No lab results found.  No lab results found.    Invalid input(s):      "

## 2023-09-29 NOTE — DISCHARGE INSTRUCTIONS
Recommend Dressing change with daily Hibiclens soak.   Dressing change: Non adherent strip over sutures, cover with 4x4, wrap with gauze roll then ACE wrap.     Follow up in 7 days with Dr Lindsay for wound check

## 2023-09-29 NOTE — PROGRESS NOTES
"Orthopedic Surgery  9/29/2023  POD 2    S: Patient voices no unexpected ortho complaints today. Denies chest pain or shortness of breath. States had throbbing overnight but resolved with PO Tylenol.    O: Blood pressure 130/54, pulse 61, temperature 98.3  F (36.8  C), temperature source Oral, resp. rate 18, height 1.651 m (5' 5\"), weight 85.7 kg (189 lb), SpO2 98 %.  Lab Results   Component Value Date    HGB 12.6 09/28/2023     Left thumb incisions benign with scabs and no active draining. Moderate swelling to thumb, hand and other finger swelling minimal. Limited ROM of thumb. Has discomfort with PROM. Cap refill<2.    Noted cultures negative to date 9/29      A: Ms. Behrendt is doing well status post Procedure(s):  Irrigation and debridement left thumb and flexor tendon sheath.    P: Overall patient is improving since washout. Dressing removed and will be re-wrapped.     Recommend Dressing change with daily Hibiclens soak.   Defer to ID for discharge ABX  Doing well with Tylenol and Ibu for pain.  May discharge from Ortho perspective and follow up in 7 days with Dr Lindsay for wound check      Shea Ruiz PA-C  [phone] 2  "

## 2023-09-29 NOTE — PLAN OF CARE
Goal Outcome Evaluation:      Plan of Care Reviewed With: patient    Overall Patient Progress: improvingOverall Patient Progress: improving       VSS.  Pain controlled with po tylenol.  Dressing changed per provider; Sutures intact with mild swelling.  Adaptic and gauze placed to incision and wrapped with gauze roll and ACE per provider instruction.  Up independently in room.  Tolerating diet.  Received IV antibiotics.  Discharged home to self care.  Discharge instructions given; all questions answered.  Aware of follow up recommendations.

## 2023-09-29 NOTE — PLAN OF CARE
Goal Outcome Evaluation:      Plan of Care Reviewed With: patient     A&Ox4, VSS. Independent in room. Pt rested comfortably between cares.   Resp: Lung sounds clear bilaterally   GI/: Adequate intake & output. Pt passing flatus and has had no BM this shift. Denies N/V.   IV: Right foot influsing LR c/d/i  Pain/Comfort: 3-6/10. Tylenol X 1, Zofran X 1  Skin: no numbness or tingling on L thumb and good sensation. Left hand elevated w/pillow  Plan: continue IV abx, ID& ortho consult

## 2023-10-02 LAB
BACTERIA BLD CULT: NO GROWTH
BACTERIA BLD CULT: NO GROWTH

## 2023-10-02 NOTE — DISCHARGE SUMMARY
Physician Discharge Summary           [facility]  Hospitalist Discharge Summary-[facility]    Name: Kara E Behrendt    MRN: [id number]     YOB: 1970    Age: 53 year old                                                     Primary care provider: [facility]    Admit date:  2023    Discharge date and time: 2023  6:37 PM    Discharge Physician: Jhony Patel M.D., M.B.A.       Primary Discharge Diagnosis    Left thumb infection with cellulitis and  lymphangitis     Secondary Diagnosis /chronic medical conditions     Past Medical History:   Diagnosis Date    Breast cancer 2010    stage 2 s/p double mastectomy and right LN dissection    History of basal cell carcinoma     Melanoma of skin      Past Surgical History:  Past Surgical History:   Procedure Laterality Date     SECTION      ENDOSCOPIC RELEASE CARPAL TUNNEL Bilateral     IRRIGATION AND DEBRIDEMENT HAND, COMBINED Left 2023    Procedure: Irrigation and debridement left thumb and flexor tendon sheath;  Surgeon: Alyssia Lindsay MD;  Location: [facility] OR    MASTECTOMY Bilateral     [place] teeth extraction             Brief Summary of Hospital stay :       Please refer to  Admission H&P note  and subsequent progress notes in EMR for full details of patient care.    Reason for Hospitalization(C/C,HPI and brief patient summary)  - thumb infection   Significant findings(Primary diagnosis )Procedures and treatments provided(Hospital course ,consults, procedures):Please see below for details    Kara E Behrendt is a 53 year old female with a history of breast ca stage 2 in  s/p bilateral mastectomy, BCC and melanoma in situ both excised  admitted on 2023 with concern for tenosynovitis     Dr Ayala did have the hand surgeon see her in the ER and patient had irrigation debridement of left thumb with IP joint irrigation on 2023      Problem list (medical problems addressed during  "hospital stay):    Left thumb cellulitis and lymphangitis  --Presentation was initially concerning for tenosynovitis.  Patient does also treated with IV antibiotic and I am surgery was consulted.    --Patient underwent irrigation debridement of left thumb with IP joint irrigation on 9/27/2023 and postoperative wanted to change with left thumb infection and lymphangitis.  There was no evidence of tenosynovitis per orthopedic surgery  -- Patient was admitted to the hospital for IV antibiotic and close monitoring   -- Infectious disease consulted and patient was continued with ancef and oral ciprofloxacin for empiric coverage of antibiotics.  --Currently she is doing well with optimal  pain control.  Cultures pending.  Plan to continue  oral antibiotic per ID service  and follow up with PCP   Remote hx of breast cancer  Would presume cured at this point      COVID 19   S/p 3 shot moderna series 1/2022         Consultations during hospital stay:       ORTHOPEDIC SURGERY IP CONSULT  INFECTIOUS DISEASES IP CONSULT      Patient discharge Condition:     stable    BP (!) 142/70   Pulse 63   Temp 98.3  F (36.8  C) (Oral)   Resp 18   Ht 1.651 m (5' 5\")   Wt 85.7 kg (189 lb)   LMP  (LMP Unknown)   SpO2 97%   BMI 31.45 kg/m         Discharge Instructions:       Patient/family instructions: Written discharge instruction given to patient/family    Discharge Medications:       Review of your medicines        START taking        Dose / Directions   ciprofloxacin 500 MG tablet  Commonly known as: CIPRO  Indication: anthrax  Used for: Flexor tenosynovitis of finger      Dose: 500 mg  Take 1 tablet (500 mg) by mouth every 12 hours for 10 days  Quantity: 20 tablet  Refills: 0            CONTINUE these medicines which have NOT CHANGED        Dose / Directions   calcium carbonate-vitamin D 600-10 MG-MCG per tablet  Commonly known as: CALTRATE      Dose: 1 tablet  Take 1 tablet by mouth daily  Refills: 0     cephALEXin 500 MG " capsule  Commonly known as: KEFLEX  Used for: Flexor tenosynovitis of finger      Dose: 500 mg  Take 1 capsule (500 mg) by mouth 4 times daily for 10 days  Quantity: 40 capsule  Refills: 0     lactobacillus rhamnosus (GG) capsule      Dose: 1 capsule  Take 1 capsule by mouth At Bedtime  Refills: 0     niacinamide 500 MG tablet      Dose: 500 mg  Take 500 mg by mouth daily  Refills: 0     PREBIOTIC PRODUCT PO      Take by mouth At Bedtime  Refills: 0     vitamin B-12 500 MCG tablet  Commonly known as: CYANOCOBALAMIN      Dose: 1 tablet  Take 1 tablet by mouth daily  Refills: 0            STOP taking      sulfamethoxazole-trimethoprim 800-160 MG tablet  Commonly known as: BACTRIM DS                  Where to get your medicines        These medications were sent to [id number] - [place], [place] - [address]  [address], [facility] [address]      Phone: [phone]   cephALEXin 500 MG capsule  ciprofloxacin 500 MG tablet          Discharge diet:Orders Placed This Encounter      Diet    regular diet      Discharge activity:Activity as tolerated      Discharge follow-up:    Follow up with primary care provider in 7 days or earlier if symptoms return or gets worse.    Follow up with consultant as instructed  with  hand surgeon as instructed       Other instructions:    We discussed with patient/family about detail discharge instructions as well as discharge medications above including potential risks,side effects and benefits.Patient/family understood benefits and potential serious side effects of taking these medications and need to follow up with PCP if the patient develops complications.  Patient is also advised to see a doctor immediately for severe symptoms.        Major procedure performed/  Significant Diagnostic Studies:       Recent Labs   Lab 09/28/23  0708 09/27/23  1031 09/26/23 2013   WBC 7.7 7.5 7.4   HGB 12.6 12.8 12.3   HCT 37.6 38.1 36.5   MCV 92 92 92    282 272     No  results for input(s): CULT in the last 168 hours.  Recent Labs   Lab 09/28/23  0708 09/27/23  1031 09/26/23 2013    137 139   POTASSIUM 4.7 4.4 4.0   CHLORIDE 105 100 101   CO2 25 27 28   ANIONGAP 8 10 10   * 94 92   BUN 13.0 14.4 15.4   CR 0.95 1.01* 0.81   GFRESTIMATED 70 65 85   AMAURY 8.9 9.3 9.3       Recent Labs   Lab 09/28/23  0708 09/27/23  1031 09/26/23 2013   * 94 92       No results for input(s): INR in the last 168 hours.          Pending Results:       Unresulted Labs Ordered in the Past 30 Days of this Admission       Date and Time Order Name Status Description    9/27/2023  8:57 PM Tissue Aerobic Bacterial Culture Routine Preliminary     9/27/2023  8:57 PM Anaerobic Bacterial Culture Routine Preliminary     9/27/2023  8:53 PM Tissue Aerobic Bacterial Culture Routine Preliminary     9/27/2023  8:53 PM Anaerobic Bacterial Culture Routine Preliminary     9/27/2023  2:16 PM Blood Culture Peripheral Blood Preliminary     9/27/2023  2:06 PM Blood Culture Wrist, Left Preliminary                Patient Allergies:       Allergies   Allergen Reactions    Contrast Dye Anaphylaxis    Penicillins      Stomach upset         Disposition:     Disposition: home    I saw and evaluated the patient on day of discharge and  discharge instructions reviewed  and  all the patient's questions and concerns addressed. Over 30 minutes spent on discharge and coordination of discharge process for this patient.      Disclaimer: This note consists of symbols derived from keyboarding, dictation and/or voice recognition software. As a result, there may be errors in the script that have gone undetected. Please consider this when interpreting information found in this chart

## 2023-10-03 LAB
BACTERIA TISS BX CULT: NO GROWTH
BACTERIA TISS BX CULT: NO GROWTH

## 2023-10-05 LAB
BACTERIA TISS BX CULT: NORMAL
BACTERIA TISS BX CULT: NORMAL

## 2023-10-24 NOTE — OP NOTE
"Date of Service: September 27, 2023    Pre-Operative Diagnosis: Left thumb infection with associated lymphangitis.    Post-Operative Diagnosis: Left thumb infection with associated lymphangitis.    Procedure:   1. Irrigation and excisional debridement left thumb to the level of the joint with irrigation of the thumb IP joint.  2. Left thenar eminence incision and drainage.    Attending Surgeon: Alyssia Lindsay MD    Assistant: Amina Gilliland PA-C.    Anesthesia: General plus local consisting of 10 ml 0.25% Marcaine plain.    Estimated Blood Loss: 2 cc    Tourniquet Time: 13 minutes.    Specimens:  1. Left thumb volar soft tissues - gram stain, aerobic, and anaerobic cultures.  2. Left thumb IP joint - gram stain, aerobic, and anaerobic cultures.    Drains: Three pieces of 1/4\" packing plain were placed into the thumb and thenar eminence wounds.    Implants: None.    Complications: None apparent.    Brief History: The patient is a 53-year-old right-hand-dominant female who sustained multiple lacerations to her left hand approximately a week ago while using a fillet knife to remove a cow hide for tanning purposes.  She self-treated the wounds at home.  However, started to have increasing redness.  She was seen at an Urgent Care and started on Bactrim.  However, this wasn't started as she noted redness streaking up her arm.  She presented to the [facility] Emergency Department on the same day and was given a second prescription for Keflex.  The following day she noted worsening pain and swelling with spreading of the forearm erythema.  She returned to the Emergency Department and was admitted for IV antibiotics.  I was consulted for further evaluation.  Due to the swelling, erythema, and spreading up the forearm, I have recommended surgical intervention in the form of left thumb incision and drainage with possible flexor tendon sheath irrigation.  I have described the procedure, post-operative protocol, and expected outcomes. "  I also discussed the risks of surgery which include, but are not limited to, bleeding, infection, nerve or vessel damage, wound healing problems, persistent pain, persistent infection, wrist or finger stiffness, and the possibility for further surgery.  Anesthetic risks are rare, but include breathing problems, heart problems, stroke and death.  After a full discussion of risks, benefits, and alternatives to surgery, the patient has elected to proceed.  Informed consent was obtained.    Intraoperative Findings: Fluid within the IP joint, but not grossly purulent. The volar thumb incision revealed no purulence and the flexor tendon sheath was pristine.  The volar thenar eminence wound had serous fluid, but no purulence.    Description of Procedure: The patient was identified in the pre-operative holding area.  Her consent was reviewed and signed.  The operative site was identified and marked.  The patient was brought to the operating room and transferred to the operating table in a supine position.  All bony prominences were well-padded.  The patient underwent induction with general anesthesia.  A tourniquet was applied to the left upper arm.  The arm was placed onto an arm table.  The left arm was prepped and draped in a standard, sterile fashion.  A time-out was performed verifying the correct patient, procedure, site, and side.  Pre-operative, prophylactic antibiotics were administered.  The arm was elevated for gravity exsanguination and the tourniquet was inflated to 250 mmHg.  A volar Robinson incision was made along the proximal phalanx of the left thumb.  This was taken through skin only.  Blunt dissection was performed down to the level of the flexor tendon sheath.  No purulence was encountered.  Gentle palpation from proximal to distal along the volar wrist and the volar thumb revealed no fluid present in the tendon sheath.    The hand was rotated, and a radial curvilinear incision was created at the level  of the thumb IP joint.  This was taken through skin only.  Hemostasis was achieved.  Blunt dissection was performed to the level of the dorsal capsule.  A radial longitudinal capsulotomy was performed.  Fluid was present in the joint.  Culture swabs and soft tissue were obtained and sent for gram stain, aerobic, and anaerobic cultures.  The joint was thoroughly irrigated with normal saline.  A synovial rongeur was used to sharply debride the soft tissues down to the level of the joint.  No further areas of concern were identified.  Due to the volar swelling and erythema with lymphangitis, a decision was made to proceed with incision over the thenar eminence to evaluate for abscess.  A 2 cm radial incision was made along the thenar eminence at the site of maximum swelling.  This was taken through skin only.  Blunt dissection was performed down to the muscle belly.  The fascia was split.  The muscle belly was bluntly probed.  No gross purulence was encountered.  Both volar wounds were thoroughly irrigated with 1 liter of normal saline.  Quarter-inch packing plain strips were placed into each of the three wounds.  The dorsal thumb and volar thumb wounds were loosely closed with interrupted 4-0 nylon with the packing placed centrally.    A total of 10 cc of 0.25% Marcaine plain was injected in a digital block fashion for the left thumb.  Soft, sterile dressings were applied, followed by a lightly-wrapped ace wrap.  The patient tolerated the procedure well, and there were no immediate complications.  She was awakened and brought to the recovery room in stable condition.  All sponge and needle counts were correct at the end of the case.     Post-Operative Plan: The patient will return to the inpatient floor for continued IV antibiotics.  Infectious disease will be consulted.  The dressings will be changed tomorrow and the packing discontinued.  No further operative intervention is planned unless her clinical picture does  not continue to improve.      Alyssia Lindsay MD

## 2024-06-27 ENCOUNTER — APPOINTMENT (OUTPATIENT)
Dept: CT IMAGING | Facility: CLINIC | Age: 54
End: 2024-06-27
Attending: EMERGENCY MEDICINE
Payer: COMMERCIAL

## 2024-06-27 ENCOUNTER — HOSPITAL ENCOUNTER (EMERGENCY)
Facility: CLINIC | Age: 54
Discharge: HOME | End: 2024-06-27
Attending: EMERGENCY MEDICINE | Admitting: EMERGENCY MEDICINE
Payer: COMMERCIAL

## 2024-06-27 VITALS
TEMPERATURE: 97 F | HEIGHT: 65 IN | RESPIRATION RATE: 16 BRPM | BODY MASS INDEX: 33.57 KG/M2 | WEIGHT: 201.5 LBS | OXYGEN SATURATION: 99 % | SYSTOLIC BLOOD PRESSURE: 109 MMHG | DIASTOLIC BLOOD PRESSURE: 74 MMHG | HEART RATE: 61 BPM

## 2024-06-27 DIAGNOSIS — S00.03XA SCALP HEMATOMA, INITIAL ENCOUNTER: ICD-10-CM

## 2024-06-27 DIAGNOSIS — S09.90XA CLOSED HEAD INJURY, INITIAL ENCOUNTER: ICD-10-CM

## 2024-06-27 PROCEDURE — 250N000011 HC RX IP 250 OP 636: Performed by: EMERGENCY MEDICINE

## 2024-06-27 PROCEDURE — 70450 CT HEAD/BRAIN W/O DYE: CPT

## 2024-06-27 PROCEDURE — 99284 EMERGENCY DEPT VISIT MOD MDM: CPT | Mod: 25

## 2024-06-27 RX ORDER — ONDANSETRON 4 MG/1
4 TABLET, ORALLY DISINTEGRATING ORAL ONCE
Status: COMPLETED | OUTPATIENT
Start: 2024-06-27 | End: 2024-06-27

## 2024-06-27 RX ADMIN — ONDANSETRON 4 MG: 4 TABLET, ORALLY DISINTEGRATING ORAL at 08:09

## 2024-06-27 ASSESSMENT — COLUMBIA-SUICIDE SEVERITY RATING SCALE - C-SSRS
2. HAVE YOU ACTUALLY HAD ANY THOUGHTS OF KILLING YOURSELF IN THE PAST MONTH?: NO
6. HAVE YOU EVER DONE ANYTHING, STARTED TO DO ANYTHING, OR PREPARED TO DO ANYTHING TO END YOUR LIFE?: NO
1. IN THE PAST MONTH, HAVE YOU WISHED YOU WERE DEAD OR WISHED YOU COULD GO TO SLEEP AND NOT WAKE UP?: NO

## 2024-06-27 ASSESSMENT — ACTIVITIES OF DAILY LIVING (ADL)
ADLS_ACUITY_SCORE: 35
ADLS_ACUITY_SCORE: 35

## 2024-06-27 NOTE — ED PROVIDER NOTES
"  Emergency Department Note      History of Present Illness     Chief Complaint   Trauma    HPI   Kara E Behrendt is a 53 year old female who presents to the ED for evaluation of trauma from a horse. The patient reports that she was walking her horse out of the barn when they got tangled in some paneling, causing her to fall. She sustained a head injury but is unsure what she hit her head on. Her pain is located in the back of the head, where her daughter states she had a spot with no wetness or bleeding. The patient also experienced nausea which has resolved. She reports left ear pain and enduring scrapes to the right knee and back. The patient's daughter states that the horse weighs around 1200 lbs and is average-sized. She denies any back or neck pain or any past medical problems. Denies vomiting. She did not lose consciousness during the incident. She states that she has not taken any pain medication.    Independent Historian   Daughter as detailed above.    Review of External Notes   None     Past Medical History     Medical History and Problem List   Breast cancer  History of basal cell carcinoma  Melanoma    Medications   The patient denies current use of medications.     Surgical History   Mastectomy  C section  Carpal tunnel release  Wells teeth extraction    Physical Exam     Patient Vitals for the past 24 hrs:   BP Temp Temp src Pulse Resp SpO2 Height Weight   06/27/24 0931 109/74 -- -- 61 -- 99 % -- --   06/27/24 0746 129/83 97  F (36.1  C) Temporal 75 16 100 % 1.651 m (5' 5\") 91.4 kg (201 lb 8 oz)     Physical Exam  GEN- alert, cooperative  HEENT- R parietal scalp swelling, PERRL, EOMI, MMM, oral pharynx without abnormalities, no dental injuries, midface stable, TM's clear bilaterally. L cheek abrasion  NECK- ROM, soft, supple, no midline C spine tenderness to palpation, no abrasions  RESP- CTAB, no w/r/r, chest wall nontender, no crepitus  CV- RRR, no m/r/g  ABD- soft, NT/ND, +BS  MSK- normal ROM in " all extremities, no T and L spinal tenderness in the midline, 5/5 strength in all extremities  NEURO- GCS 15, speech normal, alert,sensation to light touch intact in all extremities,  strong bilaterally  SKIN- no rash, no bruising, no ecchymosis  PSYCH- normal mood, normal behavior      Diagnostics     Lab Results   Labs Ordered and Resulted from Time of ED Arrival to Time of ED Departure - No data to display    Imaging   CT Head w/o Contrast   Final Result   IMPRESSION: No acute intracranial pathology. Small right parietal   scalp hematoma.      NATHANIEL HARRISON MD            SYSTEM ID:  [id number]          Independent Interpretation   None    ED Course      Medications Administered   Medications   ondansetron (ZOFRAN ODT) ODT tab 4 mg (4 mg Oral $Given 6/27/24 0809)     Procedures   Procedures     Discussion of Management   None    Social Determinants of Health adding to complexity of care   None    ED Course   ED Course as of 06/27/24 0948   Thu Jun 27, 2024   7518 I obtained history and examined the patient as noted above.     0926 I rechecked and updated the patient. The patient was able to eat crackers and drink water. Comfortable with discharge.        Medical Decision Making / Diagnosis     CMS Diagnoses: None    MIPS       None    MDM   Kara E Behrendt is a 53 year old female who presents for evaluation after head trauma.  There is trauma noted on exam, see above physical exam section.  The workup here is overall negative and reassuring.  I believe that the risk of deterioration is low and outpatient management is indicated.  There are no signs of serious head, neck, chest, abdominal, extremity, spinal or pelvic trauma to warrant further workup, surgical consultation, or transfer to a trauma center.  Nausea is gone after Zofran.  CT is negative for any severe intracranial injury.  She is reassured and given red flags to monitor.  Family here is also agreeable with close monitoring.  I advised her she  should continue to ice, use ibuprofen and Tylenol.  Return precaution provided.      Disposition   The patient was discharged.     Diagnosis     ICD-10-CM    1. Scalp hematoma, initial encounter  S00.03XA       2. Closed head injury, initial encounter  S09.90XA            Scribe Disclosure:  IPatricia, am serving as a scribe at 8:26 AM on 6/27/2024 to document services personally performed by Maria T Asencio MD based on my observations and the provider's statements to me.     Scribe Disclosure:  IAngela, am serving as a scribe  at 8:45 AM on 6/27/2024 to document services personally performed by Maria T Asencio MD based on my observations and the provider's statements to me.         Maria T Asencio MD  06/27/24 1249

## 2024-06-27 NOTE — ED TRIAGE NOTES
Pt arrives with c/o injury. Pt reports getting tangled up with a horse today while leading it out of a stall and is unsure if she got trampled or not. They ended the incident tangled up together with a metal panel bent. No LOC. Pt endorses pain to posterior scalp and abrasion to left cheek.      Triage Assessment (Adult)       Row Name 06/27/24 0747          Triage Assessment    Airway WDL WDL        Respiratory WDL    Respiratory WDL WDL        Skin Circulation/Temperature WDL    Skin Circulation/Temperature WDL WDL        Cardiac WDL    Cardiac WDL WDL        Peripheral/Neurovascular WDL    Peripheral Neurovascular WDL WDL        Cognitive/Neuro/Behavioral WDL    Cognitive/Neuro/Behavioral WDL WDL

## (undated) DEVICE — BLADE KNIFE SURG 15 371115

## (undated) DEVICE — LINEN HALF SHEET 5512

## (undated) DEVICE — CAST PADDING 4" STERILE CS9044

## (undated) DEVICE — GLOVE BIOGEL PI MICRO INDICATOR UNDERGLOVE SZ 7.5 48975

## (undated) DEVICE — PREP POVIDONE-IODINE 7.5% SCRUB 4OZ BOTTLE MDS093945

## (undated) DEVICE — DRSG GAUZE 4X4" 8044

## (undated) DEVICE — CAST PADDING 4" STERILE 9044S

## (undated) DEVICE — BAG CLEAR TRASH 1.3M 39X33" P4040C

## (undated) DEVICE — GLOVE BIOGEL PI SZ 7.0 40870

## (undated) DEVICE — SYR BULB IRRIG 50ML LATEX FREE 0035280

## (undated) DEVICE — BNDG ELASTIC 4"X5YDS UNSTERILE 6611-40

## (undated) DEVICE — LINEN FULL SHEET 5511

## (undated) DEVICE — BNDG KLING 2" 2231

## (undated) DEVICE — NDL ANGIOCATH 20GA 1.25" PROTECTIV 3066

## (undated) DEVICE — PREP POVIDONE IODINE SOLUTION 10% 4OZ BOTTLE 29906-004

## (undated) DEVICE — GLOVE BIOGEL PI MICRO INDICATOR UNDERGLOVE SZ 7.0 48970

## (undated) DEVICE — SYR 10ML LL W/O NDL 302995

## (undated) DEVICE — DRSG ADAPTIC 3X8" 6113

## (undated) DEVICE — GLOVE BIOGEL PI SZ 6.5 40865

## (undated) DEVICE — SYR 20ML LL W/O NDL

## (undated) DEVICE — PACK HAND SOP32HARMO

## (undated) DEVICE — DECANTER BAG 2002S

## (undated) RX ORDER — DEXAMETHASONE SODIUM PHOSPHATE 4 MG/ML
INJECTION, SOLUTION INTRA-ARTICULAR; INTRALESIONAL; INTRAMUSCULAR; INTRAVENOUS; SOFT TISSUE
Status: DISPENSED
Start: 2023-09-27

## (undated) RX ORDER — FENTANYL CITRATE 50 UG/ML
INJECTION, SOLUTION INTRAMUSCULAR; INTRAVENOUS
Status: DISPENSED
Start: 2023-09-27

## (undated) RX ORDER — GLYCOPYRROLATE 0.2 MG/ML
INJECTION INTRAMUSCULAR; INTRAVENOUS
Status: DISPENSED
Start: 2023-09-27

## (undated) RX ORDER — LIDOCAINE HYDROCHLORIDE 10 MG/ML
INJECTION, SOLUTION EPIDURAL; INFILTRATION; INTRACAUDAL; PERINEURAL
Status: DISPENSED
Start: 2023-09-27

## (undated) RX ORDER — ACETAMINOPHEN 325 MG/1
TABLET ORAL
Status: DISPENSED
Start: 2023-09-27

## (undated) RX ORDER — KETOROLAC TROMETHAMINE 30 MG/ML
INJECTION, SOLUTION INTRAMUSCULAR; INTRAVENOUS
Status: DISPENSED
Start: 2023-09-27

## (undated) RX ORDER — BUPIVACAINE HYDROCHLORIDE 2.5 MG/ML
INJECTION, SOLUTION EPIDURAL; INFILTRATION; INTRACAUDAL
Status: DISPENSED
Start: 2023-09-27

## (undated) RX ORDER — PROPOFOL 10 MG/ML
INJECTION, EMULSION INTRAVENOUS
Status: DISPENSED
Start: 2023-09-27

## (undated) RX ORDER — ONDANSETRON 2 MG/ML
INJECTION INTRAMUSCULAR; INTRAVENOUS
Status: DISPENSED
Start: 2023-09-27